# Patient Record
Sex: FEMALE | Race: WHITE | NOT HISPANIC OR LATINO
[De-identification: names, ages, dates, MRNs, and addresses within clinical notes are randomized per-mention and may not be internally consistent; named-entity substitution may affect disease eponyms.]

---

## 2017-05-03 ENCOUNTER — TRANSCRIPTION ENCOUNTER (OUTPATIENT)
Age: 72
End: 2017-05-03

## 2018-01-05 ENCOUNTER — OUTPATIENT (OUTPATIENT)
Dept: OUTPATIENT SERVICES | Facility: HOSPITAL | Age: 73
LOS: 1 days | End: 2018-01-05
Payer: MEDICARE

## 2018-01-05 ENCOUNTER — APPOINTMENT (OUTPATIENT)
Dept: MAMMOGRAPHY | Facility: HOSPITAL | Age: 73
End: 2018-01-05
Payer: MEDICARE

## 2018-01-05 DIAGNOSIS — Z00.8 ENCOUNTER FOR OTHER GENERAL EXAMINATION: ICD-10-CM

## 2018-01-05 PROCEDURE — 77063 BREAST TOMOSYNTHESIS BI: CPT | Mod: 26

## 2018-01-05 PROCEDURE — 77063 BREAST TOMOSYNTHESIS BI: CPT

## 2018-01-05 PROCEDURE — 77067 SCR MAMMO BI INCL CAD: CPT | Mod: 26

## 2018-01-05 PROCEDURE — 77067 SCR MAMMO BI INCL CAD: CPT

## 2018-01-16 ENCOUNTER — APPOINTMENT (OUTPATIENT)
Dept: MAMMOGRAPHY | Facility: HOSPITAL | Age: 73
End: 2018-01-16
Payer: MEDICARE

## 2018-01-16 ENCOUNTER — OUTPATIENT (OUTPATIENT)
Dept: OUTPATIENT SERVICES | Facility: HOSPITAL | Age: 73
LOS: 1 days | End: 2018-01-16
Payer: MEDICARE

## 2018-01-16 ENCOUNTER — APPOINTMENT (OUTPATIENT)
Dept: ULTRASOUND IMAGING | Facility: HOSPITAL | Age: 73
End: 2018-01-16

## 2018-01-16 DIAGNOSIS — Z00.8 ENCOUNTER FOR OTHER GENERAL EXAMINATION: ICD-10-CM

## 2018-01-16 PROCEDURE — 76641 ULTRASOUND BREAST COMPLETE: CPT | Mod: 26

## 2018-01-16 PROCEDURE — G0279: CPT | Mod: 26

## 2018-01-16 PROCEDURE — 77066 DX MAMMO INCL CAD BI: CPT

## 2018-01-16 PROCEDURE — 77066 DX MAMMO INCL CAD BI: CPT | Mod: 26

## 2018-01-16 PROCEDURE — 76641 ULTRASOUND BREAST COMPLETE: CPT

## 2018-01-16 PROCEDURE — G0279: CPT

## 2018-03-14 ENCOUNTER — APPOINTMENT (OUTPATIENT)
Dept: UROGYNECOLOGY | Facility: CLINIC | Age: 73
End: 2018-03-14
Payer: MEDICARE

## 2018-03-14 VITALS — BODY MASS INDEX: 27.89 KG/M2 | WEIGHT: 184 LBS | HEIGHT: 68 IN

## 2018-03-14 LAB
BILIRUB UR QL STRIP: NORMAL
CLARITY UR: NORMAL
COLLECTION METHOD: NORMAL
GLUCOSE UR-MCNC: NORMAL
HCG UR QL: 0.2 EU/DL
HGB UR QL STRIP.AUTO: NORMAL
KETONES UR-MCNC: NORMAL
LEUKOCYTE ESTERASE UR QL STRIP: NORMAL
NITRITE UR QL STRIP: POSITIVE
PH UR STRIP: 6
PROT UR STRIP-MCNC: NORMAL
SP GR UR STRIP: 1.01

## 2018-03-14 PROCEDURE — 99204 OFFICE O/P NEW MOD 45 MIN: CPT | Mod: 25

## 2018-03-14 PROCEDURE — 51701 INSERT BLADDER CATHETER: CPT

## 2018-03-15 ENCOUNTER — RECORD ABSTRACTING (OUTPATIENT)
Age: 73
End: 2018-03-15

## 2018-03-15 ENCOUNTER — RESULT REVIEW (OUTPATIENT)
Age: 73
End: 2018-03-15

## 2018-03-15 DIAGNOSIS — Z80.42 FAMILY HISTORY OF MALIGNANT NEOPLASM OF PROSTATE: ICD-10-CM

## 2018-03-15 DIAGNOSIS — Z86.39 PERSONAL HISTORY OF OTHER ENDOCRINE, NUTRITIONAL AND METABOLIC DISEASE: ICD-10-CM

## 2018-03-15 DIAGNOSIS — Z78.9 OTHER SPECIFIED HEALTH STATUS: ICD-10-CM

## 2018-03-15 DIAGNOSIS — Z80.1 FAMILY HISTORY OF MALIGNANT NEOPLASM OF TRACHEA, BRONCHUS AND LUNG: ICD-10-CM

## 2018-03-15 RX ORDER — LEVOTHYROXINE SODIUM 0.12 MG/1
125 TABLET ORAL
Refills: 0 | Status: ACTIVE | COMMUNITY

## 2018-03-16 ENCOUNTER — MESSAGE (OUTPATIENT)
Age: 73
End: 2018-03-16

## 2018-03-16 LAB
APPEARANCE: ABNORMAL
BACTERIA UR CULT: NORMAL
BACTERIA: ABNORMAL
BILIRUBIN URINE: NEGATIVE
BLOOD URINE: NEGATIVE
COLOR: YELLOW
GLUCOSE QUALITATIVE U: NEGATIVE MG/DL
HYALINE CASTS: 1 /LPF
KETONES URINE: NEGATIVE
LEUKOCYTE ESTERASE URINE: ABNORMAL
MICROSCOPIC-UA: NORMAL
NITRITE URINE: POSITIVE
PH URINE: 6
PROTEIN URINE: NEGATIVE MG/DL
RED BLOOD CELLS URINE: 1 /HPF
SPECIFIC GRAVITY URINE: 1.01
SQUAMOUS EPITHELIAL CELLS: 0 /HPF
UROBILINOGEN URINE: NEGATIVE MG/DL
WHITE BLOOD CELLS URINE: 20 /HPF

## 2018-03-21 ENCOUNTER — APPOINTMENT (OUTPATIENT)
Dept: UROGYNECOLOGY | Facility: CLINIC | Age: 73
End: 2018-03-21

## 2018-04-04 ENCOUNTER — APPOINTMENT (OUTPATIENT)
Dept: UROGYNECOLOGY | Facility: CLINIC | Age: 73
End: 2018-04-04
Payer: MEDICARE

## 2018-04-04 PROCEDURE — A4562: CPT

## 2018-04-04 PROCEDURE — 99214 OFFICE O/P EST MOD 30 MIN: CPT

## 2018-04-18 ENCOUNTER — APPOINTMENT (OUTPATIENT)
Dept: UROGYNECOLOGY | Facility: CLINIC | Age: 73
End: 2018-04-18
Payer: MEDICARE

## 2018-04-18 LAB
BILIRUB UR QL STRIP: NORMAL
CLARITY UR: NORMAL
GLUCOSE UR-MCNC: NORMAL
HCG UR QL: 0.2 EU/DL
HGB UR QL STRIP.AUTO: NORMAL
KETONES UR-MCNC: NORMAL
LEUKOCYTE ESTERASE UR QL STRIP: NORMAL
NITRITE UR QL STRIP: NORMAL
PH UR STRIP: 6
PROT UR STRIP-MCNC: NORMAL
SP GR UR STRIP: 1

## 2018-04-18 PROCEDURE — A4562: CPT

## 2018-04-18 PROCEDURE — 99214 OFFICE O/P EST MOD 30 MIN: CPT

## 2018-04-19 ENCOUNTER — RESULT REVIEW (OUTPATIENT)
Age: 73
End: 2018-04-19

## 2018-04-23 LAB
APPEARANCE: CLEAR
BACTERIA: ABNORMAL
BILIRUBIN URINE: NEGATIVE
BLOOD URINE: NEGATIVE
COLOR: YELLOW
GLUCOSE QUALITATIVE U: NEGATIVE MG/DL
HYALINE CASTS: 3 /LPF
KETONES URINE: NEGATIVE
LEUKOCYTE ESTERASE URINE: ABNORMAL
MICROSCOPIC-UA: NORMAL
NITRITE URINE: POSITIVE
PH URINE: 6
PROTEIN URINE: NEGATIVE MG/DL
RED BLOOD CELLS URINE: 1 /HPF
SPECIFIC GRAVITY URINE: 1.01
SQUAMOUS EPITHELIAL CELLS: 1 /HPF
UROBILINOGEN URINE: NEGATIVE MG/DL
WHITE BLOOD CELLS URINE: 40 /HPF

## 2018-04-25 ENCOUNTER — RESULT REVIEW (OUTPATIENT)
Age: 73
End: 2018-04-25

## 2018-04-25 LAB — BACTERIA UR CULT: ABNORMAL

## 2018-05-02 ENCOUNTER — APPOINTMENT (OUTPATIENT)
Dept: UROGYNECOLOGY | Facility: CLINIC | Age: 73
End: 2018-05-02
Payer: MEDICARE

## 2018-05-02 LAB
BILIRUB UR QL STRIP: NORMAL
CLARITY UR: NORMAL
COLLECTION METHOD: NORMAL
GLUCOSE UR-MCNC: NORMAL
HCG UR QL: 0.2 EU/DL
HGB UR QL STRIP.AUTO: NORMAL
KETONES UR-MCNC: NORMAL
LEUKOCYTE ESTERASE UR QL STRIP: NORMAL
NITRITE UR QL STRIP: NORMAL
PH UR STRIP: 5.5
PROT UR STRIP-MCNC: NORMAL
SP GR UR STRIP: 1

## 2018-05-02 PROCEDURE — 99213 OFFICE O/P EST LOW 20 MIN: CPT

## 2018-05-02 RX ORDER — NITROFURANTOIN (MONOHYDRATE/MACROCRYSTALS) 25; 75 MG/1; MG/1
100 CAPSULE ORAL
Qty: 10 | Refills: 0 | Status: DISCONTINUED | COMMUNITY
Start: 2018-04-25 | End: 2018-05-02

## 2018-05-02 RX ORDER — AMPICILLIN 500 MG/1
500 CAPSULE ORAL
Qty: 14 | Refills: 0 | Status: DISCONTINUED | COMMUNITY
Start: 2018-03-16 | End: 2018-05-02

## 2018-05-07 ENCOUNTER — RESULT REVIEW (OUTPATIENT)
Age: 73
End: 2018-05-07

## 2018-05-07 LAB — BACTERIA UR CULT: ABNORMAL

## 2018-05-11 ENCOUNTER — APPOINTMENT (OUTPATIENT)
Dept: UROGYNECOLOGY | Facility: CLINIC | Age: 73
End: 2018-05-11
Payer: MEDICARE

## 2018-05-11 PROCEDURE — 51729 CYSTOMETROGRAM W/VP&UP: CPT | Mod: 26

## 2018-05-11 PROCEDURE — 51784 ANAL/URINARY MUSCLE STUDY: CPT | Mod: 26

## 2018-05-11 PROCEDURE — 51797 INTRAABDOMINAL PRESSURE TEST: CPT | Mod: 26

## 2018-05-16 ENCOUNTER — APPOINTMENT (OUTPATIENT)
Dept: UROGYNECOLOGY | Facility: CLINIC | Age: 73
End: 2018-05-16
Payer: MEDICARE

## 2018-05-16 PROCEDURE — 51701 INSERT BLADDER CATHETER: CPT

## 2018-05-16 PROCEDURE — 99214 OFFICE O/P EST MOD 30 MIN: CPT | Mod: 25

## 2018-05-18 ENCOUNTER — APPOINTMENT (OUTPATIENT)
Dept: UROGYNECOLOGY | Facility: CLINIC | Age: 73
End: 2018-05-18
Payer: MEDICARE

## 2018-05-18 ENCOUNTER — RESULT REVIEW (OUTPATIENT)
Age: 73
End: 2018-05-18

## 2018-05-18 LAB — BACTERIA UR CULT: NORMAL

## 2018-05-18 PROCEDURE — 99214 OFFICE O/P EST MOD 30 MIN: CPT | Mod: GC

## 2018-05-24 ENCOUNTER — MESSAGE (OUTPATIENT)
Age: 73
End: 2018-05-24

## 2018-06-14 ENCOUNTER — APPOINTMENT (OUTPATIENT)
Dept: UROLOGY | Facility: CLINIC | Age: 73
End: 2018-06-14
Payer: MEDICARE

## 2018-06-14 VITALS
TEMPERATURE: 98.1 F | BODY MASS INDEX: 26.07 KG/M2 | HEART RATE: 66 BPM | RESPIRATION RATE: 16 BRPM | WEIGHT: 172 LBS | HEIGHT: 68 IN | OXYGEN SATURATION: 95 % | DIASTOLIC BLOOD PRESSURE: 72 MMHG | SYSTOLIC BLOOD PRESSURE: 110 MMHG

## 2018-06-14 DIAGNOSIS — R39.11 HESITANCY OF MICTURITION: ICD-10-CM

## 2018-06-14 DIAGNOSIS — N39.0 URINARY TRACT INFECTION, SITE NOT SPECIFIED: ICD-10-CM

## 2018-06-14 PROCEDURE — 99203 OFFICE O/P NEW LOW 30 MIN: CPT

## 2018-06-14 RX ORDER — CEPHALEXIN 500 MG/1
500 CAPSULE ORAL
Qty: 14 | Refills: 0 | Status: DISCONTINUED | COMMUNITY
Start: 2018-05-02 | End: 2018-06-14

## 2018-06-19 PROBLEM — N39.0 RECURRENT UTI: Status: ACTIVE | Noted: 2018-05-16

## 2018-06-19 PROBLEM — R39.11 URINARY HESITANCY: Status: ACTIVE | Noted: 2018-03-14

## 2018-07-24 PROBLEM — Z78.9 ALCOHOL USE: Status: ACTIVE | Noted: 2018-03-15

## 2019-03-20 ENCOUNTER — TRANSCRIPTION ENCOUNTER (OUTPATIENT)
Age: 74
End: 2019-03-20

## 2019-09-30 ENCOUNTER — MESSAGE (OUTPATIENT)
Age: 74
End: 2019-09-30

## 2019-11-11 ENCOUNTER — APPOINTMENT (OUTPATIENT)
Dept: UROGYNECOLOGY | Facility: CLINIC | Age: 74
End: 2019-11-11
Payer: MEDICARE

## 2019-11-11 PROCEDURE — 99214 OFFICE O/P EST MOD 30 MIN: CPT

## 2019-11-11 NOTE — DISCUSSION/SUMMARY
[FreeTextEntry1] : \par Batool presents with rectocele and incomplete emptying. 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 She had no improvement with flomax or with prolapse reduction (gelhorn last pessary tried). I recommend the following:\par - Cystoscopy, scheduled for 12/10/19\par - Continue CIC 4x/day\par - Patient has refill of catheters already \par - If normal cystoscopy, will recommend neurology evaluation

## 2019-11-11 NOTE — HISTORY OF PRESENT ILLNESS
[Cystocele (Obstetric)] : frequent [Uterine Prolapse] : frequent [Rectal Prolapse] : frequent [Unable To Restrain Bowel Movement] : none [Hematuria] : daily [Urinary Tract Infection] : daily [] : months ago [FreeTextEntry1] : \par 73yo with a h/o rectocele and incomplete bladder emptying. She underwent urodynamic testing and a detrusor contraction was observed however PVR was 450 cc.  Last visit was in 2018. At that time she had bothersome prolapse and was using a pessary. She had elevated PVRs, up to 1000cc, and was referred to Dr Lomeli. Patient was then referred to Dr Monae, who she did not see. \par \par She has been self-catheterizing 4-5x/day, whenever she feels like she has a full bladder. She will attempt to void ~1x/week without success. She feels the urge to urinate, but states when she tries to void only a few drops come out. Patient is not measuring catheterized volumes. She had no improvement with flomax and discontinued use. Denies any lower extremity weakness or changes in sensation. Denies infections, hematuria, incontinence. She states her prolapse is unchanged from last visit and she still needs to splint occasionally to defecate. She reports she is able to empty her bowels completely and denies constipation.

## 2019-11-11 NOTE — PHYSICAL EXAM
[No Acute Distress] : in no acute distress [Well developed] : well developed [Well Nourished] : ~L well nourished [Oriented x3] : oriented to person, place, and time [Normal Memory] : ~T memory was ~L unimpaired [Normal Mood/Affect] : mood and affect are normal [Normal Gait] : gait was normal [Warm and Dry] : was warm and dry to touch [Normal Appearance] : ~T the appearance of the neck was normal [Labia Majora] : were normal [Labia Minora] : were normal [Rectocele] : a rectocele [Atrophy] : atrophy [No Bleeding] : there was no active vaginal bleeding [Normal] : no abnormalities [Aa ____] : Aa [unfilled] [C ____] : C [unfilled] [Ba ____] : Ba [unfilled] [Ap ____] : Ap [unfilled] [Bp ____] : Bp [unfilled]

## 2019-12-10 ENCOUNTER — OUTPATIENT (OUTPATIENT)
Dept: OUTPATIENT SERVICES | Facility: HOSPITAL | Age: 74
LOS: 1 days | End: 2019-12-10
Payer: MEDICARE

## 2019-12-10 ENCOUNTER — APPOINTMENT (OUTPATIENT)
Dept: UROGYNECOLOGY | Facility: CLINIC | Age: 74
End: 2019-12-10
Payer: MEDICARE

## 2019-12-10 DIAGNOSIS — Z01.818 ENCOUNTER FOR OTHER PREPROCEDURAL EXAMINATION: ICD-10-CM

## 2019-12-10 PROCEDURE — 52000 CYSTOURETHROSCOPY: CPT

## 2020-03-10 ENCOUNTER — APPOINTMENT (OUTPATIENT)
Dept: UROGYNECOLOGY | Facility: CLINIC | Age: 75
End: 2020-03-10

## 2021-12-10 ENCOUNTER — NON-APPOINTMENT (OUTPATIENT)
Age: 76
End: 2021-12-10

## 2021-12-13 ENCOUNTER — APPOINTMENT (OUTPATIENT)
Dept: UROGYNECOLOGY | Facility: CLINIC | Age: 76
End: 2021-12-13
Payer: MEDICARE

## 2021-12-13 PROCEDURE — 99214 OFFICE O/P EST MOD 30 MIN: CPT

## 2021-12-13 NOTE — PHYSICAL EXAM
[No Acute Distress] : in no acute distress [Well developed] : well developed [Well Nourished] : ~L well nourished [Oriented x3] : oriented to person, place, and time [Normal Memory] : ~T memory was ~L unimpaired [Normal Mood/Affect] : mood and affect are normal [Normal Appearance] : ~T the appearance of the neck was normal [Warm and Dry] : was warm and dry to touch [Normal Gait] : gait was normal [Labia Majora] : were normal [Labia Minora] : were normal [Atrophy] : atrophy [Rectocele] : a rectocele [No Bleeding] : there was no active vaginal bleeding [Aa ____] : Aa [unfilled] [Ba ____] : Ba [unfilled] [C ____] : C [unfilled] [Normal] : no abnormalities [Ap ____] : Ap [unfilled] [Bp ____] : Bp [unfilled]

## 2021-12-13 NOTE — DISCUSSION/SUMMARY
[FreeTextEntry1] : \par Batool presents with rectocele and incomplete emptying. 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 She had no improvement with flomax or with prolapse reduction (gelhorn last pessary tried). I recommend the following:\par - Continue CIC 4x/day, catheters provided\par - Rx provided for catheters\par -  recommend neurology evaluation, another copy of referral note provided\par -obtain renal sonogram, Rx provided

## 2021-12-13 NOTE — HISTORY OF PRESENT ILLNESS
[FreeTextEntry1] : \nba Renae presents for follow up with a h/o rectocele and incomplete bladder emptying.\par \par  She underwent urodynamic testing in 2018 and a detrusor contraction was observed however PVR was 450 cc.  She underwent cystoscopy 12/19. Last visit was in  December 2019. For her prolapse she previously used pessary however there was no change in her voiding with prolapse reduction. She no longer uses pessary and denies significant bother from prolapse.\par \par She has been self-catheterizing 4-5x/day, whenever she feels like she has a full bladder.  She feels the urge to urinate, but states when she tries to void only a few drops come out. Patient is not measuring catheterized volumes. She had no improvement with flomax in past and discontinued use. She denies any lower extremity weakness or changes in sensation. Denies infections, hematuria, incontinence. She states her prolapse is unchanged from last visit and she still needs to splint ~50% of time to completely defecate. She reports she is able to empty her bowels completely with splinting and denies constipation. \par \par At her last visit I recommended she see neurology however she was unable to due to pandemic. She agrees to go now. She also did not obtain renal/bladder sono.

## 2021-12-13 NOTE — PROCEDURE
[Straight Catheterization] : insertion of a straight catheter [Urinary Retention] : urinary retention [Clear] : clear [No Complications] : no complications [Tolerated Well] : the patient tolerated the procedure well [Post procedure instructions and information given] : Post procedure instructions and information were given and reviewed with patient. [0] : 0 [FreeTextEntry1] : 100 cc urine collected, paitent had catheterized 1 hour earlier

## 2021-12-21 ENCOUNTER — NON-APPOINTMENT (OUTPATIENT)
Age: 76
End: 2021-12-21

## 2021-12-27 ENCOUNTER — NON-APPOINTMENT (OUTPATIENT)
Age: 76
End: 2021-12-27

## 2021-12-28 ENCOUNTER — NON-APPOINTMENT (OUTPATIENT)
Age: 76
End: 2021-12-28

## 2022-02-12 ENCOUNTER — EMERGENCY (EMERGENCY)
Facility: HOSPITAL | Age: 77
LOS: 1 days | Discharge: HOME | End: 2022-02-12
Attending: EMERGENCY MEDICINE | Admitting: EMERGENCY MEDICINE
Payer: MEDICARE

## 2022-02-12 VITALS
HEART RATE: 75 BPM | OXYGEN SATURATION: 97 % | RESPIRATION RATE: 18 BRPM | DIASTOLIC BLOOD PRESSURE: 93 MMHG | TEMPERATURE: 98 F | WEIGHT: 198.64 LBS | SYSTOLIC BLOOD PRESSURE: 187 MMHG

## 2022-02-12 VITALS
HEART RATE: 71 BPM | DIASTOLIC BLOOD PRESSURE: 71 MMHG | TEMPERATURE: 98 F | SYSTOLIC BLOOD PRESSURE: 129 MMHG | OXYGEN SATURATION: 98 % | RESPIRATION RATE: 17 BRPM

## 2022-02-12 DIAGNOSIS — Z90.49 ACQUIRED ABSENCE OF OTHER SPECIFIED PARTS OF DIGESTIVE TRACT: Chronic | ICD-10-CM

## 2022-02-12 DIAGNOSIS — Z96.651 PRESENCE OF RIGHT ARTIFICIAL KNEE JOINT: Chronic | ICD-10-CM

## 2022-02-12 LAB
ALBUMIN SERPL ELPH-MCNC: 4.3 G/DL — SIGNIFICANT CHANGE UP (ref 3.3–5)
ALP SERPL-CCNC: 63 U/L — SIGNIFICANT CHANGE UP (ref 40–120)
ALT FLD-CCNC: 17 U/L — SIGNIFICANT CHANGE UP (ref 10–45)
ANION GAP SERPL CALC-SCNC: 10 MMOL/L — SIGNIFICANT CHANGE UP (ref 5–17)
APTT BLD: 29.2 SEC — SIGNIFICANT CHANGE UP (ref 27.5–35.5)
AST SERPL-CCNC: 14 U/L — SIGNIFICANT CHANGE UP (ref 10–40)
BASOPHILS # BLD AUTO: 0.06 K/UL — SIGNIFICANT CHANGE UP (ref 0–0.2)
BASOPHILS NFR BLD AUTO: 1 % — SIGNIFICANT CHANGE UP (ref 0–2)
BILIRUB SERPL-MCNC: 0.3 MG/DL — SIGNIFICANT CHANGE UP (ref 0.2–1.2)
BUN SERPL-MCNC: 17 MG/DL — SIGNIFICANT CHANGE UP (ref 7–23)
CALCIUM SERPL-MCNC: 9.8 MG/DL — SIGNIFICANT CHANGE UP (ref 8.4–10.5)
CHLORIDE SERPL-SCNC: 100 MMOL/L — SIGNIFICANT CHANGE UP (ref 96–108)
CO2 SERPL-SCNC: 27 MMOL/L — SIGNIFICANT CHANGE UP (ref 22–31)
CREAT SERPL-MCNC: 0.81 MG/DL — SIGNIFICANT CHANGE UP (ref 0.5–1.3)
EOSINOPHIL # BLD AUTO: 0.12 K/UL — SIGNIFICANT CHANGE UP (ref 0–0.5)
EOSINOPHIL NFR BLD AUTO: 2 % — SIGNIFICANT CHANGE UP (ref 0–6)
GLUCOSE SERPL-MCNC: 110 MG/DL — HIGH (ref 70–99)
HCT VFR BLD CALC: 39.1 % — SIGNIFICANT CHANGE UP (ref 34.5–45)
HGB BLD-MCNC: 12.8 G/DL — SIGNIFICANT CHANGE UP (ref 11.5–15.5)
IMM GRANULOCYTES NFR BLD AUTO: 0.3 % — SIGNIFICANT CHANGE UP (ref 0–1.5)
INR BLD: 0.99 RATIO — SIGNIFICANT CHANGE UP (ref 0.88–1.16)
LYMPHOCYTES # BLD AUTO: 1.26 K/UL — SIGNIFICANT CHANGE UP (ref 1–3.3)
LYMPHOCYTES # BLD AUTO: 21.2 % — SIGNIFICANT CHANGE UP (ref 13–44)
MCHC RBC-ENTMCNC: 30.2 PG — SIGNIFICANT CHANGE UP (ref 27–34)
MCHC RBC-ENTMCNC: 32.7 GM/DL — SIGNIFICANT CHANGE UP (ref 32–36)
MCV RBC AUTO: 92.2 FL — SIGNIFICANT CHANGE UP (ref 80–100)
MONOCYTES # BLD AUTO: 0.45 K/UL — SIGNIFICANT CHANGE UP (ref 0–0.9)
MONOCYTES NFR BLD AUTO: 7.6 % — SIGNIFICANT CHANGE UP (ref 2–14)
NEUTROPHILS # BLD AUTO: 4.03 K/UL — SIGNIFICANT CHANGE UP (ref 1.8–7.4)
NEUTROPHILS NFR BLD AUTO: 67.9 % — SIGNIFICANT CHANGE UP (ref 43–77)
NRBC # BLD: 0 /100 WBCS — SIGNIFICANT CHANGE UP (ref 0–0)
PLATELET # BLD AUTO: 228 K/UL — SIGNIFICANT CHANGE UP (ref 150–400)
POTASSIUM SERPL-MCNC: 4.5 MMOL/L — SIGNIFICANT CHANGE UP (ref 3.5–5.3)
POTASSIUM SERPL-SCNC: 4.5 MMOL/L — SIGNIFICANT CHANGE UP (ref 3.5–5.3)
PROT SERPL-MCNC: 8.2 G/DL — SIGNIFICANT CHANGE UP (ref 6–8.3)
PROTHROM AB SERPL-ACNC: 12 SEC — SIGNIFICANT CHANGE UP (ref 10.6–13.6)
RBC # BLD: 4.24 M/UL — SIGNIFICANT CHANGE UP (ref 3.8–5.2)
RBC # FLD: 12.4 % — SIGNIFICANT CHANGE UP (ref 10.3–14.5)
SARS-COV-2 RNA SPEC QL NAA+PROBE: SIGNIFICANT CHANGE UP
SODIUM SERPL-SCNC: 137 MMOL/L — SIGNIFICANT CHANGE UP (ref 135–145)
TROPONIN I, HIGH SENSITIVITY RESULT: 4.7 NG/L — SIGNIFICANT CHANGE UP
WBC # BLD: 5.94 K/UL — SIGNIFICANT CHANGE UP (ref 3.8–10.5)
WBC # FLD AUTO: 5.94 K/UL — SIGNIFICANT CHANGE UP (ref 3.8–10.5)

## 2022-02-12 PROCEDURE — 93010 ELECTROCARDIOGRAM REPORT: CPT

## 2022-02-12 PROCEDURE — 85025 COMPLETE CBC W/AUTO DIFF WBC: CPT

## 2022-02-12 PROCEDURE — 70450 CT HEAD/BRAIN W/O DYE: CPT | Mod: XU,MA

## 2022-02-12 PROCEDURE — 70496 CT ANGIOGRAPHY HEAD: CPT | Mod: 26,MA

## 2022-02-12 PROCEDURE — 99285 EMERGENCY DEPT VISIT HI MDM: CPT

## 2022-02-12 PROCEDURE — 70496 CT ANGIOGRAPHY HEAD: CPT | Mod: MA

## 2022-02-12 PROCEDURE — 36415 COLL VENOUS BLD VENIPUNCTURE: CPT

## 2022-02-12 PROCEDURE — 70498 CT ANGIOGRAPHY NECK: CPT | Mod: MA

## 2022-02-12 PROCEDURE — 84484 ASSAY OF TROPONIN QUANT: CPT

## 2022-02-12 PROCEDURE — 93005 ELECTROCARDIOGRAM TRACING: CPT

## 2022-02-12 PROCEDURE — 70498 CT ANGIOGRAPHY NECK: CPT | Mod: 26,MA

## 2022-02-12 PROCEDURE — 71045 X-RAY EXAM CHEST 1 VIEW: CPT

## 2022-02-12 PROCEDURE — 80053 COMPREHEN METABOLIC PANEL: CPT

## 2022-02-12 PROCEDURE — 85730 THROMBOPLASTIN TIME PARTIAL: CPT

## 2022-02-12 PROCEDURE — 82962 GLUCOSE BLOOD TEST: CPT

## 2022-02-12 PROCEDURE — 85610 PROTHROMBIN TIME: CPT

## 2022-02-12 PROCEDURE — 71045 X-RAY EXAM CHEST 1 VIEW: CPT | Mod: 26

## 2022-02-12 PROCEDURE — 0042T: CPT

## 2022-02-12 PROCEDURE — 87635 SARS-COV-2 COVID-19 AMP PRB: CPT

## 2022-02-12 PROCEDURE — 99285 EMERGENCY DEPT VISIT HI MDM: CPT | Mod: 25

## 2022-02-12 RX ORDER — ASPIRIN/CALCIUM CARB/MAGNESIUM 324 MG
324 TABLET ORAL ONCE
Refills: 0 | Status: COMPLETED | OUTPATIENT
Start: 2022-02-12 | End: 2022-02-12

## 2022-02-12 RX ADMIN — Medication 324 MILLIGRAM(S): at 22:22

## 2022-02-12 NOTE — ED PROVIDER NOTE - OBJECTIVE STATEMENT
77 y/o F with PMH of HTN, HLD presents to the ED with c/o b/l visual change (felt like there was a square box around her eyes), had word finding difficulty and was confused at 430pm this afternoon while driving home on the same route she always uses. Sympts last few hours and is now resolved. Also report mild HA at the time which is now resolved. She denies CP, SOB, palpitation, n/v, dizziness, abd pain, extremity weakness, paresthesias or all other complaints 75 y/o F with PMH of thyroid dz presents to the ED with c/o b/l visual change (pt was seeing objects in pieces), had word finding difficulty and was confused at 430pm this afternoon while driving home on the same route she always uses. Sympts last few hours and is now resolved. Also report mild HA at the time which is now resolved. She denies CP, SOB, palpitation, n/v, f/c, URI sympts, dizziness, abd pain, extremity weakness, paresthesias or all other complaints

## 2022-02-12 NOTE — ED ADULT NURSE NOTE - CHIEF COMPLAINT QUOTE
Patient presents to ED from home complaining of blurred vision & trouble finding words from 4:30pm-7:30pm. Patient is hypertensive with mild headache in triage but other symptoms have subsided. PA called to triage and neurological exam completed. Patient taken immediately to CT scan, fingerstick 96. ISAR negative.

## 2022-02-12 NOTE — ED PROVIDER NOTE - ATTENDING CONTRIBUTION TO CARE
I have personally seen and examined this patient. I have fully participated in the care of this patient. I have reviewed all pertinent clinical information, including history physical exam, plan and the PA's note and agree except as noted  77 y/o F with PMH of thyroid dz presents to the ED with c/o b/l visual change (pt was seeing objects in pieces), had word finding difficulty and was confused at 430pm this afternoon while driving home on the same route she always uses. Sympts last few hours and is now resolved. Also report mild HA at the time which is now resolved. She denies CP, SOB, palpitation, n/v, f/c, URI sympts, dizziness, abd pain, extremity weakness, paresthesias or all other complaints

## 2022-02-12 NOTE — ED PROVIDER NOTE - MUSCULOSKELETAL, MLM
Spine appears normal, range of motion is not limited, no muscle or joint tenderness. pt ambulating with normal gait in the ED

## 2022-02-12 NOTE — ED ADULT TRIAGE NOTE - CHIEF COMPLAINT QUOTE
Patient presents to ED from home complaining of blurred vision & trouble finding words from 4:30pm-7:30pm. Patient is hypertensive with mild headache in triage but other symptoms have subsided. PA called to triage and neurological exam completed. Patient taken immediately to CT scan, fingerstick 96. Patient presents to ED from home complaining of blurred vision & trouble finding words from 4:30pm-7:30pm. Patient is hypertensive with mild headache in triage but other symptoms have subsided. PA called to triage and neurological exam completed. Patient taken immediately to CT scan, fingerstick 96. ISAR negative.

## 2022-02-12 NOTE — ED PROVIDER NOTE - CLINICAL SUMMARY MEDICAL DECISION MAKING FREE TEXT BOX
77 y/o F with PMH of thyroid dz presents to the ED with c/o b/l visual change (pt was seeing objects in pieces), had word finding difficulty and was confused at 430pm this afternoon while driving home on the same route she always uses. Sympts last few hours and is now resolved. Also report mild HA at the time which is now resolved. She denies CP, SOB, palpitation, n/v, f/c, URI sympts, dizziness, abd pain, extremity weakness, paresthesias or all other complaints. PE as noted above. NIHSS is 0.  labs/imaging pending 75 y/o F with PMH of thyroid dz presents to the ED with c/o b/l visual change (pt was seeing objects in pieces), had word finding difficulty and was confused at 430pm this afternoon while driving home on the same route she always uses. Sympts last few hours and is now resolved. Also report mild HA at the time which is now resolved. She denies CP, SOB, palpitation, n/v, f/c, URI sympts, dizziness, abd pain, extremity weakness, paresthesias or all other complaints. PE as noted above. NIHSS is 0.  labs/imaging pending    10pm: labs reviewed, CT results reviewed with patient and her son at bedside. shared decision making with patient, she prefers to follow up outpatient with neurology. ASA given in the ED. will dc with instructions to take ASA 81mg daily until she sees neuro

## 2022-02-12 NOTE — ED PROVIDER NOTE - EYES, MLM
Clear bilaterally, pupils equal, round and reactive to light. Clear bilaterally, pupils equal, round and reactive to light. EOMI. Visual fields intact

## 2022-02-12 NOTE — ED ADULT NURSE NOTE - OBJECTIVE STATEMENT
Pt is alert, brought to the ER by son due to headache and difficulty remembering things like the name of the drink that she wanted to order at the restaurant around 5 PM/ Denies slurred speech, chest pain or SOB. Headache started around 430 pm with slight nausea. Pt took Tylenol for the headache PTA. Pt also reported changes in vision that lasted from 5 to 7 PM. Images appear distorted . History of Hypothyroidism. Unvaccinated for Covid.

## 2022-02-12 NOTE — ED PROVIDER NOTE - NSFOLLOWUPINSTRUCTIONS_ED_ALL_ED_FT
Follow up with your primary care physician and neurology within 1 week, call the number provided for an appointment. Take the copy of your test results you were given in the emergency room for your doctor to review.     Take over the counter Aspirin 81mg daily until you see the neurologist     Stay hydrated    Return to the ER if your symptoms worsen or for any other medical emergencies  *************    Transient Ischemic Attack    WHAT YOU NEED TO KNOW:    A transient ischemic attack (TIA), or mini-stroke, happens when blood cannot flow to part of the brain. A TIA only lasts minutes to hours and does not cause lasting damage. It is still important to get immediate medical care. A TIA may be a warning that you are about to have an ischemic stroke. An ischemic stroke happens when blood flow to the brain is suddenly blocked, usually by a blood clot.    Ischemic Stroke         DISCHARGE INSTRUCTIONS:    Call your local emergency number (911 in the [place]) or have someone else call if:   •You have any of the following signs of a stroke: ?Numbness or drooping on one side of your face       ?Weakness in an arm or leg      ?Confusion or difficulty speaking      ?Dizziness, a severe headache, or vision loss    BE FAST SIGNS OF A STROKE         •You have a seizure.      •You have chest pain or shortness of breath.      •You cough up blood.      Return to the emergency department if:   •Your arm or leg feels warm, tender, and painful. It may look swollen and red.      •You have unusual or heavy bleeding.      •You have a severe headache or feel dizzy.      Call your doctor or neurologist if:   •Your blood pressure or blood sugar level is higher or lower than you were told it should be.      •You have questions or concerns about your condition or care.      Warning signs of a stroke: The words BE FAST can help you remember and recognize warning signs of a stroke:  •B = Balance: Sudden loss of balance      •E = Eyes: Loss of vision in one or both eyes      •F = Face: Face droops on one side      •A = Arms: Arm drops when both arms are raised      •S = Speech: Speech is slurred or sounds different      •T = Time: Time to get help immediately    BE FAST SIGNS OF A STROKE         Medicines: You may need any of the following:   •Antiplatelets, such as aspirin, help prevent blood clots. Take your antiplatelet medicine exactly as directed. These medicines make it more likely for you to bleed or bruise. If you are told to take aspirin, do not take acetaminophen or ibuprofen instead.       •Blood thinners help prevent blood clots. Clots can cause strokes, heart attacks, and death. The following are general safety guidelines to follow while you are taking a blood thinner:?Watch for bleeding and bruising while you take blood thinners. Watch for bleeding from your gums or nose. Watch for blood in your urine and bowel movements. Use a soft washcloth on your skin, and a soft toothbrush to brush your teeth. This can keep your skin and gums from bleeding. If you shave, use an electric shaver. Do not play contact sports.       ?Tell your dentist and other healthcare providers that you take a blood thinner. Wear a bracelet or necklace that says you take this medicine.       ?Do not start or stop any other medicines unless your healthcare provider tells you to. Many medicines cannot be used with blood thinners.      ?Take your blood thinner exactly as prescribed by your healthcare provider. Do not skip does or take less than prescribed. Tell your provider right away if you forget to take your blood thinner, or if you take too much.      ?Warfarin is a blood thinner that you may need to take. The following are things you should be aware of if you take warfarin: ?Foods and medicines can affect the amount of warfarin in your blood. Do not make major changes to your diet while you take warfarin. Warfarin works best when you eat about the same amount of vitamin K every day. Vitamin K is found in green leafy vegetables and certain other foods. Ask for more information about what to eat when you are taking warfarin.      ?You will need to see your healthcare provider for follow-up visits when you are on warfarin. You will need regular blood tests. These tests are used to decide how much medicine you need.         •Other medicines may be needed to treat diabetes, depression, high cholesterol, or blood pressure problems. You may also need medicine to decrease the pressure in your brain, reduce pain, or prevent seizures.      •Take your medicine as directed. Contact your healthcare provider if you think your medicine is not helping or if you have side effects. Tell him of her if you are allergic to any medicine. Keep a list of the medicines, vitamins, and herbs you take. Include the amounts, and when and why you take them. Bring the list or the pill bottles to follow-up visits. Carry your medicine list with you in case of an emergency.      What you can do to prevent another TIA or a stroke:   •Manage health conditions. A condition such as diabetes can increase your risk for a stroke. Control your blood sugar level if you have hyperglycemia or diabetes. Take your prescribed medicines and check your blood sugar level as directed.  How to check your blood sugar           •Check your blood pressure as directed. High blood pressure can increase your risk for a stroke. If you have high blood pressure, follow your healthcare provider’s directions for controlling your blood pressure.   How to take a Blood Pressure           •Do not use nicotine products or illegal drugs. Nicotine and other chemicals in cigarettes and cigars can cause blood vessel damage. Nicotine and illegal drugs both increase your risk for a stroke. Ask your healthcare provider for information if you currently smoke or use drugs and need help to quit. E- cigarettes or smokeless tobacco still contain nicotine. Talk to your healthcare provider before you use these products.      •Talk to your healthcare provider about alcohol. Alcohol can raise your blood pressure. The recommended limit is 2 drinks in a day for men and 1 drink in a day for women. Do not binge drink or save a week's worth of alcohol to drink in 1 or 2 days. Limit weekly amounts as directed by your provider.      •Eat a variety of healthy foods. Healthy foods include whole-grain breads, low-fat dairy products, beans, lean meats, and fish. Eat at least 5 servings of fruits and vegetables each day. Choose foods that are low in fat, cholesterol, salt, and sugar. Eat foods that are high in potassium, such as potatoes and bananas. A dietitian can help you create healthy meal plans.   Healthy Foods           •Maintain a healthy weight. Ask your healthcare provider how much you should weigh. Ask him or her to help you create a weight loss plan if you are overweight. He or she can help you create small goals if you have a lot of weight to lose.      •Exercise as directed. Exercise can lower your blood pressure, cholesterol, weight, and blood sugar levels. Healthcare providers will help you create exercise goals. They can also help you make a plan to reach your goals. For example, you can break exercise into 10 minute periods, 3 times in the day. Find an exercise that you enjoy. This will make it easier for you to reach your exercise goals.  Walking for Exercise           •Manage stress. Stress can raise your blood pressure. Find ways to relax, such as deep breathing or listening to music.      Follow up with your doctor or neurologist in 1 to 2 days: Write down your questions so you remember to ask them during your visits.

## 2022-02-12 NOTE — ED ADULT TRIAGE NOTE - LOCATION:
[FreeTextEntry1] : EKG today was within normal limits\par PFT from today revealed normal spirometry\par  Left arm;

## 2022-02-12 NOTE — ED PROVIDER NOTE - CARE PROVIDER_API CALL
Joselyn Zheng  NEUROLOGY  [address] [place] [address]  Phone: [phone]  Fax: [phone]  Follow Up Time:     Joni Wright (MD)  Neurology  [address] [facility], [address]  [place], [place] [id number]  Phone: [phone]  Fax: [phone]  Follow Up Time:

## 2022-02-12 NOTE — ED ADULT NURSE NOTE - NSIMPLEMENTINTERV_GEN_ALL_ED
Implemented All Universal Safety Interventions:  [place] to call system. Call bell, personal items and telephone within reach. Instruct patient to call for assistance. Room bathroom lighting operational. Non-slip footwear when patient is off stretcher. Physically safe environment: no spills, clutter or unnecessary equipment. Stretcher in lowest position, wheels locked, appropriate side rails in place.

## 2022-02-14 PROBLEM — E03.9 HYPOTHYROIDISM, UNSPECIFIED: Chronic | Status: ACTIVE | Noted: 2022-02-12

## 2022-02-17 NOTE — CHART NOTE - NSCHARTNOTEFT_GEN_A_CORE
SW called Pt at home to discuss and assist with followup care. Pt presenting to  ED on 2/12 and diagnosed with TIA. SW left a message encouraging Pt to return SW call should Pt have further needs and questions. Per Malena BARRAZA, Pt has a follow-up neurology appointment scheduled for 3/17.

## 2022-02-28 ENCOUNTER — APPOINTMENT (OUTPATIENT)
Dept: CT IMAGING | Facility: HOSPITAL | Age: 77
End: 2022-02-28
Payer: MEDICARE

## 2022-02-28 ENCOUNTER — OUTPATIENT (OUTPATIENT)
Dept: OUTPATIENT SERVICES | Facility: HOSPITAL | Age: 77
LOS: 1 days | End: 2022-02-28
Payer: MEDICARE

## 2022-02-28 DIAGNOSIS — Z90.49 ACQUIRED ABSENCE OF OTHER SPECIFIED PARTS OF DIGESTIVE TRACT: Chronic | ICD-10-CM

## 2022-02-28 DIAGNOSIS — Z96.651 PRESENCE OF RIGHT ARTIFICIAL KNEE JOINT: Chronic | ICD-10-CM

## 2022-02-28 DIAGNOSIS — Z00.8 ENCOUNTER FOR OTHER GENERAL EXAMINATION: ICD-10-CM

## 2022-02-28 PROCEDURE — 74176 CT ABD & PELVIS W/O CONTRAST: CPT

## 2022-02-28 PROCEDURE — 74176 CT ABD & PELVIS W/O CONTRAST: CPT | Mod: 26

## 2022-03-17 ENCOUNTER — APPOINTMENT (OUTPATIENT)
Dept: NEUROLOGY | Facility: CLINIC | Age: 77
End: 2022-03-17
Payer: MEDICARE

## 2022-03-17 VITALS
SYSTOLIC BLOOD PRESSURE: 118 MMHG | BODY MASS INDEX: 29.55 KG/M2 | HEIGHT: 68 IN | WEIGHT: 195 LBS | HEART RATE: 70 BPM | DIASTOLIC BLOOD PRESSURE: 76 MMHG

## 2022-03-17 DIAGNOSIS — Z78.9 OTHER SPECIFIED HEALTH STATUS: ICD-10-CM

## 2022-03-17 DIAGNOSIS — H53.30 UNSPECIFIED DISORDER OF BINOCULAR VISION: ICD-10-CM

## 2022-03-17 DIAGNOSIS — Z82.49 FAMILY HISTORY OF ISCHEMIC HEART DISEASE AND OTHER DISEASES OF THE CIRCULATORY SYSTEM: ICD-10-CM

## 2022-03-17 DIAGNOSIS — Z86.73 PERSONAL HISTORY OF TRANSIENT ISCHEMIC ATTACK (TIA), AND CEREBRAL INFARCTION W/OUT RESIDUAL DEFICITS: ICD-10-CM

## 2022-03-17 DIAGNOSIS — G43.109 MIGRAINE WITH AURA, NOT INTRACTABLE, W/OUT STATUS MIGRAINOSUS: ICD-10-CM

## 2022-03-17 DIAGNOSIS — Z82.0 FAMILY HISTORY OF EPILEPSY AND OTHER DISEASES OF THE NERVOUS SYSTEM: ICD-10-CM

## 2022-03-17 PROCEDURE — 99205 OFFICE O/P NEW HI 60 MIN: CPT

## 2022-03-17 NOTE — CONSULT LETTER
[Dear  ___] : Dear  [unfilled], [Consult Letter:] : I had the pleasure of evaluating your patient, [unfilled]. [Please see my note below.] : Please see my note below. [Consult Closing:] : Thank you very much for allowing me to participate in the care of this patient.  If you have any questions, please do not hesitate to contact me. [Sincerely,] : Sincerely, [FreeTextEntry3] : Joni Wright MD\par

## 2022-03-17 NOTE — HISTORY OF PRESENT ILLNESS
[FreeTextEntry1] : Araceli Escudero is seen for an office consultation.  She is a 76-year-old right-handed woman who states that on 2/12/2022 while at the pottery studio she describes binocular vision in pieces or fractured vision.  The episode lasted for approximately 15 minutes.  She subsequently was with friends and noted word finding and symptoms to suggest aphasia.  There was a mild frontal headache.  She was seen in the [facility] emergency room on 2/12/2022 and CT angiogram of the head neck and CT of the brain were all negative.  Blood tests were unremarkable.  She states that during the last 3 years she has had approximately 3 episodes of binocular visual disturbance but these were mild and lasted only 5 to 10 minutes and then 1 were no definite problems with language during those episodes though she did not attempt to verbalize.  Her blood pressure was elevated in the emergency room.  She was told to take aspirin 81 mg daily.\par \par Past history significant for hypothyroidism.  She reports borderline elevation of cholesterol.  She denies any prior history of hypertension.\par \par Family history positive for cerebral aneurysm and migraine and benign brain tumor.

## 2022-03-17 NOTE — ASSESSMENT
[FreeTextEntry1] : Impression: This 76-year-old female right-handed patient presented on 2/12/2022 with acute episode of binocular visual disturbance described as in pieces and fractured associated with aphasia and mild frontal headache.  During the last 3 years she has had 3 prior episodes of a similar visual disturbance.  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  Rule out TIA .  The CT of the brain and CT angiograms of head and neck performed in the emergency room were negative.  Today's neurological exam is unremarkable in this regard.\par \par Recommendations: MRI of the brain and MRA studies brain and neck.  Aspirin 81 mg daily for least the next 6 months and reevaluate.  If serum cholesterol is elevated would treat with statin medication.  Cardiology consult Dr. Jones's group to rule out emboli.  Office follow-up.\par

## 2022-03-17 NOTE — PHYSICAL EXAM
[FreeTextEntry1] : Head:  Normocephalic Neck: Supple nontender no carotid bruits.\par \par Mental Status:  Alert Oriented X3 Speech normal and no aphasia or dysarthria.\par \par Cranial Nerves:  PERRL, Visual Fields full  EOMI no diplopia no ptosis no nystagmus, V through XII intact.\par \par Motor:  No drift, normal strength tone and coordination and no focal atrophy. No abnormal movements. No dysmetria.  Normal rapid alternating movements. \par \par DTRs: Symmetric 1-2+ except for bilaterally absent ankle reflexes.  Plantars flexor.  No Clonus.\par \par Sensory:  Normal testing with pin light touch and vibration and  Joint position sense.  Normal DSS to touch.\par \par Gait:  Normal including tandem walking heel toe walking and Rhomberg.\par

## 2022-03-17 NOTE — REASON FOR VISIT
[Initial Evaluation] : an initial evaluation [FreeTextEntry1] : Episodes of binocular visual disturbance and aphasia

## 2022-04-04 ENCOUNTER — OUTPATIENT (OUTPATIENT)
Dept: OUTPATIENT SERVICES | Facility: HOSPITAL | Age: 77
LOS: 1 days | End: 2022-04-04
Payer: MEDICARE

## 2022-04-04 ENCOUNTER — APPOINTMENT (OUTPATIENT)
Dept: MRI IMAGING | Facility: HOSPITAL | Age: 77
End: 2022-04-04
Payer: MEDICARE

## 2022-04-04 DIAGNOSIS — G43.109 MIGRAINE WITH AURA, NOT INTRACTABLE, WITHOUT STATUS MIGRAINOSUS: ICD-10-CM

## 2022-04-04 DIAGNOSIS — Z86.73 PERSONAL HISTORY OF TRANSIENT ISCHEMIC ATTACK (TIA), AND CEREBRAL INFARCTION WITHOUT RESIDUAL DEFICITS: ICD-10-CM

## 2022-04-04 DIAGNOSIS — Z96.651 PRESENCE OF RIGHT ARTIFICIAL KNEE JOINT: Chronic | ICD-10-CM

## 2022-04-04 DIAGNOSIS — Z90.49 ACQUIRED ABSENCE OF OTHER SPECIFIED PARTS OF DIGESTIVE TRACT: Chronic | ICD-10-CM

## 2022-04-04 PROCEDURE — 70544 MR ANGIOGRAPHY HEAD W/O DYE: CPT | Mod: 26,59

## 2022-04-04 PROCEDURE — 70551 MRI BRAIN STEM W/O DYE: CPT

## 2022-04-04 PROCEDURE — 70547 MR ANGIOGRAPHY NECK W/O DYE: CPT

## 2022-04-04 PROCEDURE — 70551 MRI BRAIN STEM W/O DYE: CPT | Mod: 26

## 2022-04-04 PROCEDURE — 70544 MR ANGIOGRAPHY HEAD W/O DYE: CPT

## 2022-04-04 PROCEDURE — 70547 MR ANGIOGRAPHY NECK W/O DYE: CPT | Mod: 26

## 2022-04-05 ENCOUNTER — NON-APPOINTMENT (OUTPATIENT)
Age: 77
End: 2022-04-05

## 2022-04-07 ENCOUNTER — NON-APPOINTMENT (OUTPATIENT)
Age: 77
End: 2022-04-07

## 2022-04-07 ENCOUNTER — APPOINTMENT (OUTPATIENT)
Dept: CARDIOLOGY | Facility: CLINIC | Age: 77
End: 2022-04-07
Payer: MEDICARE

## 2022-04-07 VITALS
BODY MASS INDEX: 31.08 KG/M2 | RESPIRATION RATE: 16 BRPM | HEIGHT: 67 IN | HEART RATE: 69 BPM | WEIGHT: 198 LBS | TEMPERATURE: 97.8 F | SYSTOLIC BLOOD PRESSURE: 120 MMHG | OXYGEN SATURATION: 98 % | DIASTOLIC BLOOD PRESSURE: 83 MMHG

## 2022-04-07 PROCEDURE — 93000 ELECTROCARDIOGRAM COMPLETE: CPT | Mod: 59

## 2022-04-07 PROCEDURE — 99203 OFFICE O/P NEW LOW 30 MIN: CPT

## 2022-04-07 PROCEDURE — 93246 EXT ECG>7D<15D RECORDING: CPT

## 2022-04-14 NOTE — CARDIOLOGY SUMMARY
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 No associated mass effect, bleeding, or shift. Patent central intracranial circulation. No hemodynamically significant stenosis or occlusion. Patent cervical vasculature. No hemodynamically significant stenosis or occlusion.\par

## 2022-04-14 NOTE — HISTORY OF PRESENT ILLNESS
[FreeTextEntry1] : Batool Escudero is a 76 year old woman, former remote cigarette smoker with past medical history of Hypothyroidism with recent ER visit for visual disturbances and word finding difficulties in February presents for cardiac consultation regarding possible cardioembolic cause of lacunar CVA noted on recent MRI brain.\par \par The patient reports that back in February she recalls having dinner with friends and started to have word finding difficulties for about 40 minutes. She also had visual disturbances which lasted about 1 hour. She presented to [facility] for further evaluation. She does recall having previous episodes of blurred/"fractured" vision 2-3 years ago, she recalls being alone at that time, so is not sure if she had speech difficulties. She is active, walks around, has not experienced exertional chest pain, can notice some shortness of breath when walking up stairs. Has had brief, infrequent fast heart beats, no syncope. Denies prior history of myocardial infarction or coronary stenting. Denies history of atrial fibrillation or arrhythmias.

## 2022-04-14 NOTE — PHYSICAL EXAM
[Normal Conjunctiva] : normal conjunctiva [Normal Gait] : normal gait [No Edema] : no edema [No Rash] : no rash [de-identified] : elderly woman, no acute distress [de-identified] : supple, no carotid bruits b/l [de-identified] : unlabored respirations, clear lung fields [de-identified] : JVP ~ 7 cm H20, RRR, s1, s2, no murmurs/rubs

## 2022-04-14 NOTE — ASSESSMENT
[FreeTextEntry1] : Assessment:\par Batool Escudero is a 76 year old woman, former remote cigarette smoker with past medical history of Hypothyroidism with recent ER visit for visual disturbances and word finding difficulties in February presents for cardiac consultation regarding possible cardioembolic cause of lacunar CVA noted on recent MRI brain.\par \par It appears that in February the patient had an event while eating dinner in which she had word finding difficulties & visual disturbances. She was evaluated by Neurology/Dr. Wright and MRI was suggestive of a left temporal lobe acute lacunar infarct, and was referred to cardiology office to rule out cardioembolic cause for CVA. ECG consistent with sinus rhythm, nonspecific ST abnormalities, recent labs unremarkable. Given concern for an acute lacunar infarct, recommend the following:\par \par Recommendations:\par [] Lacunar CVA: Will check echocardiogram to evaluate for structural heart disease, would recommend echo with IV contrast to ensure no LV thrombus. Will also arrange for 2 week cardiac event monitor to evaluate for atrial arrhythmias. Pending the above workup, the patient may require additional cardiac testing possible SWETHA for embolic cause of CVA, if deemed necessary by Neurologist. Recommended patient to check BP daily and keep log of this to ensure BP < 140/90. Recommended patient to avoid alcohol use as this increases risk for CVA and cardiac disease. \par [] Return to office: 1 month \par

## 2022-04-14 NOTE — REVIEW OF SYSTEMS
[Dyspnea on exertion] : dyspnea during exertion [Palpitations] : palpitations [Fever] : no fever [Chills] : no chills [Earache] : no earache [Sore Throat] : no sore throat [Chest Discomfort] : no chest discomfort [Lower Ext Edema] : no extremity edema [Abdominal Pain] : no abdominal pain [Joint Pain] : no joint pain [Rash] : no rash [Dizziness] : no dizziness [Confusion] : no confusion was observed [Easy Bruising] : no tendency for easy bruising

## 2022-04-19 ENCOUNTER — APPOINTMENT (OUTPATIENT)
Dept: NEUROLOGY | Facility: CLINIC | Age: 77
End: 2022-04-19
Payer: MEDICARE

## 2022-04-19 VITALS
DIASTOLIC BLOOD PRESSURE: 80 MMHG | HEART RATE: 72 BPM | SYSTOLIC BLOOD PRESSURE: 126 MMHG | HEIGHT: 67 IN | WEIGHT: 198 LBS | BODY MASS INDEX: 31.08 KG/M2

## 2022-04-19 DIAGNOSIS — G43.109 MIGRAINE WITH AURA, NOT INTRACTABLE, W/OUT STATUS MIGRAINOSUS: ICD-10-CM

## 2022-04-19 PROCEDURE — 99215 OFFICE O/P EST HI 40 MIN: CPT

## 2022-04-19 RX ORDER — ASPIRIN 325 MG/1
TABLET, FILM COATED ORAL
Refills: 0 | Status: ACTIVE | COMMUNITY

## 2022-04-19 NOTE — HISTORY OF PRESENT ILLNESS
[FreeTextEntry1] : This patient is seen for an office visit.  MRI of the brain and MRA studies of the brain and neck were performed on 4/4/2022.  There is evidence of a tiny punctate focus of restricted diffusion corresponding to ADC mapping in the left hippocampal body suggesting a tiny acute lacunar infarction in the left temporal lobe.  There are some scattered hyperintense foci in the periventricular deep white matter and there was a chronic microbleed in the right temporal occipital region.  MRI studies did not reveal significant stenosis.  The patient was referred to a cardiologist and is having ambulatory heart monitoring and further echocardiographic studies.  She did have another episode within the last day of a binocular visual disturbance as previously described and it was associated mild headache.  She denies recurrence of aphasia.  She has been taking aspirin 81 mg once daily.  She did not contact her PCP about starting a statin medication.\par \par She has a history of borderline elevation of cholesterol and hypothyroidism.  Family history positive for cerebral aneurysm migraine and benign brain tumor.

## 2022-04-19 NOTE — ASSESSMENT
[FreeTextEntry1] : Impression: This 76-year-old female patient has episodes of binocular visual disturbance with aphasia and headache.  In this setting following 1 of these episodes of brain MRI did reveal a tiny punctate lacunar infarct in the left hippocampus.  Suspect this may be a complicated migraine.  There are cases where diffusion positive findings can be seen in this setting.  Rule out cardioembolic event and the patient is seen the cardiologist in this regard.  MR angiogram did not reveal any vascular compromise.\par \par Recommendations: Continue cardiac work-up including ambulatory heart monitoring and I would also favor SWETHA.  Aspirin 81 mg daily and consider a statin.  The patient will have a fasting lipid profile.  Consider a calcium channel blocker such as verapamil for recurrent symptomatology suggesting migraine aura.  Office follow-up.\par

## 2022-05-02 ENCOUNTER — OUTPATIENT (OUTPATIENT)
Dept: OUTPATIENT SERVICES | Facility: HOSPITAL | Age: 77
LOS: 1 days | End: 2022-05-02
Payer: MEDICARE

## 2022-05-02 DIAGNOSIS — Z90.49 ACQUIRED ABSENCE OF OTHER SPECIFIED PARTS OF DIGESTIVE TRACT: Chronic | ICD-10-CM

## 2022-05-02 DIAGNOSIS — I63.81 OTHER CEREBRAL INFARCTION DUE TO OCCLUSION OR STENOSIS OF SMALL ARTERY: ICD-10-CM

## 2022-05-02 DIAGNOSIS — Z96.651 PRESENCE OF RIGHT ARTIFICIAL KNEE JOINT: Chronic | ICD-10-CM

## 2022-05-02 PROCEDURE — 93306 TTE W/DOPPLER COMPLETE: CPT | Mod: 26

## 2022-05-02 PROCEDURE — 93306 TTE W/DOPPLER COMPLETE: CPT

## 2022-05-05 ENCOUNTER — APPOINTMENT (OUTPATIENT)
Dept: CARDIOLOGY | Facility: CLINIC | Age: 77
End: 2022-05-05
Payer: MEDICARE

## 2022-05-05 ENCOUNTER — APPOINTMENT (OUTPATIENT)
Dept: CARDIOLOGY | Facility: CLINIC | Age: 77
End: 2022-05-05

## 2022-05-05 ENCOUNTER — NON-APPOINTMENT (OUTPATIENT)
Age: 77
End: 2022-05-05

## 2022-05-05 VITALS
RESPIRATION RATE: 20 BRPM | DIASTOLIC BLOOD PRESSURE: 80 MMHG | TEMPERATURE: 97.5 F | HEIGHT: 67 IN | OXYGEN SATURATION: 100 % | WEIGHT: 198 LBS | BODY MASS INDEX: 31.08 KG/M2 | SYSTOLIC BLOOD PRESSURE: 126 MMHG | HEART RATE: 61 BPM

## 2022-05-05 DIAGNOSIS — I47.2 VENTRICULAR TACHYCARDIA: ICD-10-CM

## 2022-05-05 DIAGNOSIS — I63.81 OTHER CEREBRAL INFARCTION DUE TO OCCLUSION OR STENOSIS OF SMALL ARTERY: ICD-10-CM

## 2022-05-05 DIAGNOSIS — I49.3 VENTRICULAR PREMATURE DEPOLARIZATION: ICD-10-CM

## 2022-05-05 PROCEDURE — 93000 ELECTROCARDIOGRAM COMPLETE: CPT

## 2022-05-05 PROCEDURE — 99213 OFFICE O/P EST LOW 20 MIN: CPT

## 2022-05-06 PROCEDURE — 93248 EXT ECG>7D<15D REV&INTERPJ: CPT

## 2022-05-07 PROBLEM — I47.2 NSVT (NONSUSTAINED VENTRICULAR TACHYCARDIA): Status: ACTIVE | Noted: 2022-05-07

## 2022-05-07 PROBLEM — I49.3 PVC (PREMATURE VENTRICULAR CONTRACTION): Status: ACTIVE | Noted: 2022-05-05

## 2022-05-07 RX ORDER — METOPROLOL SUCCINATE 25 MG/1
25 TABLET, EXTENDED RELEASE ORAL
Qty: 2 | Refills: 0 | Status: ACTIVE | COMMUNITY
Start: 2022-05-07 | End: 1900-01-01

## 2022-05-07 NOTE — HISTORY OF PRESENT ILLNESS
[FreeTextEntry1] : Batool Escudero is a 76 year old woman, former remote cigarette smoker with past medical history of Hypothyroidism with recent ER visit for visual disturbances and word finding difficulties in February presents for follow-up visit regarding evaluation for possible cardioembolic cause of lacunar CVA noted on recent MRI brain.\par \par Since her last visit the patient reports feeling well. Did recall having recurrent visual disturbances since her last visit. Denies palpitations or syncope. Denies chest pain or shortness of breath.

## 2022-05-07 NOTE — PHYSICAL EXAM
[Normal Conjunctiva] : normal conjunctiva [Normal Gait] : normal gait [No Edema] : no edema [No Rash] : no rash [de-identified] : elderly woman, no acute distress [de-identified] : JVP ~ 7 cm H20, RRR, s1, s2, no murmurs/rubs [de-identified] : supple, no carotid bruits b/l [de-identified] : unlabored respirations, clear lung fields

## 2022-05-07 NOTE — CARDIOLOGY SUMMARY
[de-identified] : ECG (4/7/22): normal sinus rhythm, left axis deviation, nonspecific ST abnormalities \par ECG (5/5/22): normal sinus rhythm, nonspecific ST abnormalities  [de-identified] : Cardiac Event Monitor (4/7-4/21/22): HR , Avg HR 67 BPM. PAC < 1 %. PVC < 1%. One 4 beat episode of NSVT. 8 episodes of SVT. No Pauses, AV block or Atrial fibrillation. [de-identified] : TTE (5/2022): LVEF 65-70%. Normal RV size and function, hypertrabeculated RV apex. Lipomatous interatrial septum. Agitated infection of saline contrast ("bubble study") with no obvious interatrial communication. Mild MR. Mild TR. No aortic valve stenosis. Mild AR. Mild UT. Dilated proximal ascending aorta (4.1 cm). No pericardial effusion.  [de-identified] : MRI Brain (4/4/22): Acute lacunar infarction in the left hippocampal body. No associated mass effect, bleeding, or shift. Patent central intracranial circulation. No hemodynamically significant stenosis or occlusion. Patent cervical vasculature. No hemodynamically significant stenosis or occlusion.\par

## 2022-05-07 NOTE — ASSESSMENT
[FreeTextEntry1] : Assessment:\par Batool Escudero is a 76 year old woman, former remote cigarette smoker with past medical history of Hypothyroidism with recent ER visit for visual disturbances and word finding difficulties in February presents for follow-up visit regarding evaluation for possible cardioembolic cause of lacunar CVA noted on recent MRI brain.\par \par Recent echocardiogram consistent with normal LV and RV systolic function, hypertrabeculated RV apex, lipomatous interatrial septum with no obvious shunting by "bubble study" and dilated proximal ascending aorta, no significant valvular disease. ECG today consistent with normal sinus rhythm. BP within normal limits.\par \par Recommendations:\par [] NSVT: Noted on cardiac event monitor, will check coronary CT angiogram to evaluate for underlying CAD given risk factors.\par [] Lacunar CVA vs complicated migraine: As per Neurology symptoms may be a complicated migraine but they recommend cardioembolic workup. Cardiac event monitor with no obvious atrial fibrillation noted, however 8 episodes of SVT were noted. Will have Cardiac EP review the event monitor strips. May consider SWETHA if no concerning atrial arrhythmia on event monitor, and if Neurology deems it is necessary as patient reports that her impression was that this was more of a migraine and not stroke. Recommended patient to avoid alcohol use as this increases risk for CVA and cardiac disease. Continue Aspirin\par [] Return to office: 2 months \par

## 2022-05-07 NOTE — REVIEW OF SYSTEMS
[Fever] : no fever [Chills] : no chills [Earache] : no earache [Sore Throat] : no sore throat [SOB] : no shortness of breath [Dyspnea on exertion] : not dyspnea during exertion [Chest Discomfort] : no chest discomfort [Lower Ext Edema] : no extremity edema [Palpitations] : no palpitations [Abdominal Pain] : no abdominal pain [Joint Pain] : no joint pain [Rash] : no rash [Dizziness] : no dizziness [Confusion] : no confusion was observed [Easy Bruising] : no tendency for easy bruising

## 2022-05-31 ENCOUNTER — APPOINTMENT (OUTPATIENT)
Dept: NEUROLOGY | Facility: CLINIC | Age: 77
End: 2022-05-31

## 2022-06-12 ENCOUNTER — NON-APPOINTMENT (OUTPATIENT)
Age: 77
End: 2022-06-12

## 2022-06-13 ENCOUNTER — NON-APPOINTMENT (OUTPATIENT)
Age: 77
End: 2022-06-13

## 2022-06-14 ENCOUNTER — NON-APPOINTMENT (OUTPATIENT)
Age: 77
End: 2022-06-14

## 2023-02-06 ENCOUNTER — NON-APPOINTMENT (OUTPATIENT)
Age: 78
End: 2023-02-06

## 2023-02-07 ENCOUNTER — APPOINTMENT (OUTPATIENT)
Dept: UROGYNECOLOGY | Facility: CLINIC | Age: 78
End: 2023-02-07
Payer: MEDICARE

## 2023-02-07 PROCEDURE — 99213 OFFICE O/P EST LOW 20 MIN: CPT

## 2023-03-11 ENCOUNTER — APPOINTMENT (OUTPATIENT)
Dept: ULTRASOUND IMAGING | Facility: CLINIC | Age: 78
End: 2023-03-11
Payer: MEDICARE

## 2023-03-11 ENCOUNTER — OUTPATIENT (OUTPATIENT)
Dept: OUTPATIENT SERVICES | Facility: HOSPITAL | Age: 78
LOS: 1 days | End: 2023-03-11
Payer: MEDICARE

## 2023-03-11 DIAGNOSIS — R33.9 RETENTION OF URINE, UNSPECIFIED: ICD-10-CM

## 2023-03-11 DIAGNOSIS — Z96.651 PRESENCE OF RIGHT ARTIFICIAL KNEE JOINT: Chronic | ICD-10-CM

## 2023-03-11 DIAGNOSIS — Z90.49 ACQUIRED ABSENCE OF OTHER SPECIFIED PARTS OF DIGESTIVE TRACT: Chronic | ICD-10-CM

## 2023-03-11 PROCEDURE — 76770 US EXAM ABDO BACK WALL COMP: CPT | Mod: 26

## 2023-03-11 PROCEDURE — 76770 US EXAM ABDO BACK WALL COMP: CPT

## 2023-07-06 ENCOUNTER — NON-APPOINTMENT (OUTPATIENT)
Age: 78
End: 2023-07-06

## 2023-07-13 ENCOUNTER — APPOINTMENT (OUTPATIENT)
Dept: UROGYNECOLOGY | Facility: CLINIC | Age: 78
End: 2023-07-13
Payer: MEDICARE

## 2023-07-13 VITALS
DIASTOLIC BLOOD PRESSURE: 83 MMHG | WEIGHT: 195 LBS | BODY MASS INDEX: 30.61 KG/M2 | HEIGHT: 67 IN | SYSTOLIC BLOOD PRESSURE: 128 MMHG

## 2023-07-13 PROCEDURE — 99214 OFFICE O/P EST MOD 30 MIN: CPT

## 2023-07-13 PROCEDURE — A4562: CPT

## 2023-07-13 NOTE — HISTORY OF PRESENT ILLNESS
[FreeTextEntry1] : Batool is a 78 year old with known history of rectocele and incomplete bladder emptying.  She was last seen in this office on 2/7/2023.  For her prolapse, she has previously used a GH LS 3, Cube #4 and Cube#5,  but stopped as prolapse reduction did not affect her voiding.  She feels her prolapse has worsened since February.  For her incomplete bladder emptying, she continues to self-catheterize 4-5 times per day, whenever she feels the bladder is full. No dysuria.  She is not measuring cath volumes.  She does not use Flomax. She feels she is able to empty her bowels completely however she does splint ~50% of the time.   Today she presents to the office to be refitted with a pessary.\par \par

## 2023-07-13 NOTE — PROCEDURE
[Good Fit] : fits well [Refit] : refit is not needed [Erosion] : no evidence of erosion [Erythema] : no erythema [Discharge] : no vaginal discharge [Infection] : no evidence of infection [Pessary Inserted] : inserted [None] : no bleeding [Medication Review] : Medicaiton Review: Patient verbalizes understanding of risks and benefits [Bladder Training] : Bladder Training: Patient given information with verbal understanding [FreeTextEntry1] : GH LS 3 1/4

## 2023-07-13 NOTE — DISCUSSION/SUMMARY
[FreeTextEntry1] : Pelvic prolapse:\par GH LS 3 1/4 inserted\par Reviewed pessary precautions and instructions\par \par Incomplete emptying:\par - ml\par -Continue with CIC 4-5 times per day\par -Advised pt to measure urine output\par Will RTO in 2 weeks or sooner if needed\par \par All questions answered to the patient's satisfaction.  She expressed understanding. She knows to contact the office with any questions or concerns.\par

## 2023-07-13 NOTE — PHYSICAL EXAM
[No Acute Distress] : in no acute distress [Well developed] : well developed [Well Nourished] : ~L well nourished [Good Hygeine] : demonstrates good hygeine [Oriented x3] : oriented to person, place, and time [Normal Memory] : ~T memory was ~L unimpaired [Normal Mood/Affect] : mood and affect are normal [Warm and Dry] : was warm and dry to touch [Normal Gait] : gait was normal [No Lesions] : no lesions were seen on the external genitalia [Vulvar Atrophy] : vulvar atrophy [Labia Majora] : were normal [Labia Minora] : were normal [Atrophy] : atrophy [Dry Mucosa] : dry mucosa [Rectocele] : a rectocele [No Bleeding] : there was no active vaginal bleeding [Anxiety] : patient is not anxious [Post Void Residual ____ml] : post void residual was [unfilled] ml

## 2023-07-27 ENCOUNTER — APPOINTMENT (OUTPATIENT)
Dept: UROGYNECOLOGY | Facility: CLINIC | Age: 78
End: 2023-07-27
Payer: MEDICARE

## 2023-07-27 VITALS
WEIGHT: 203 LBS | HEIGHT: 67 IN | DIASTOLIC BLOOD PRESSURE: 88 MMHG | BODY MASS INDEX: 31.86 KG/M2 | SYSTOLIC BLOOD PRESSURE: 135 MMHG

## 2023-07-27 PROCEDURE — 99213 OFFICE O/P EST LOW 20 MIN: CPT

## 2023-09-06 ENCOUNTER — APPOINTMENT (OUTPATIENT)
Dept: UROGYNECOLOGY | Facility: CLINIC | Age: 78
End: 2023-09-06
Payer: MEDICARE

## 2023-09-06 PROCEDURE — 99214 OFFICE O/P EST MOD 30 MIN: CPT

## 2023-09-06 NOTE — PROCEDURE
[Good Fit] : fits well [Refit] : refit needed [Erosion] : no evidence of erosion [Erythema] : no erythema [Discharge] : no vaginal discharge [Infection] : no evidence of infection [Pessary Inserted] : inserted [Pessary Washed] : washed [H2O] : H2O [None] : no bleeding [FreeTextEntry1] : Kostas HINOJOSA # 3 1/4 [FreeTextEntry8] : Reinforced daily pericare.

## 2023-09-06 NOTE — HISTORY OF PRESENT ILLNESS
[FreeTextEntry1] : Batool, 77 y/o presents to the office for follow up on pelvic prolapse, hx of incomplete emptying and Rectocele, supported with Kostas HINOJOSA#3 1/4.  She is very happy with the support.   She continues to perform CIC 4-5 times per day.  She is moving her bowels without difficulty.  No vaginal bleeding.  She was on vacation in [place] and doing well up until this past Friday, pessary was coming down and she felt it was about to fall out and she removed it.  She would like pessary reinsertion.  Denies being constipated and feels with the pessary she is able to urinate on her own and then self-catheterize.

## 2023-09-06 NOTE — DISCUSSION/SUMMARY
[FreeTextEntry1] : Rectocele: -Continue with Gelrush LS # 3 1/4 -Precaution and instructions were reviewed. -Briefly discussed surgical option.  PT will think about it.  Incomplete bladder emptying: -Continue CIC 4-5 times per day   Follow up in 2-3 months or sooner if needed.  IF pt have any problems/concern to call office.  PT aware and agrees.

## 2023-09-06 NOTE — PHYSICAL EXAM
[No Acute Distress] : in no acute distress [Well developed] : well developed [Well Nourished] : ~L well nourished [Good Hygeine] : demonstrates good hygeine [Oriented x3] : oriented to person, place, and time [Normal Memory] : ~T memory was ~L unimpaired [Normal Mood/Affect] : mood and affect are normal [Anxiety] : patient is not anxious [Warm and Dry] : was warm and dry to touch [Normal Gait] : gait was normal [Vulvar Atrophy] : vulvar atrophy [Labia Majora] : were normal [Labia Minora] : were normal [Dry Mucosa] : dry mucosa [Rectocele] : a rectocele [Discharge] : a  ~M vaginal discharge was present [Scant] : scant [White] : white [Thin] : thin [No Bleeding] : there was no active vaginal bleeding [de-identified] : prolapse protruding out of vaginal opening.

## 2023-10-01 PROBLEM — I63.81 LACUNAR INFARCTION: Status: ACTIVE | Noted: 2022-04-07

## 2023-10-24 ENCOUNTER — APPOINTMENT (OUTPATIENT)
Dept: RADIOLOGY | Facility: HOSPITAL | Age: 78
End: 2023-10-24
Payer: MEDICARE

## 2023-10-24 ENCOUNTER — OUTPATIENT (OUTPATIENT)
Dept: OUTPATIENT SERVICES | Facility: HOSPITAL | Age: 78
LOS: 1 days | End: 2023-10-24
Payer: MEDICARE

## 2023-10-24 DIAGNOSIS — Z00.8 ENCOUNTER FOR OTHER GENERAL EXAMINATION: ICD-10-CM

## 2023-10-24 DIAGNOSIS — Z90.49 ACQUIRED ABSENCE OF OTHER SPECIFIED PARTS OF DIGESTIVE TRACT: Chronic | ICD-10-CM

## 2023-10-24 DIAGNOSIS — Z96.651 PRESENCE OF RIGHT ARTIFICIAL KNEE JOINT: Chronic | ICD-10-CM

## 2023-10-24 PROCEDURE — 73521 X-RAY EXAM HIPS BI 2 VIEWS: CPT | Mod: 26

## 2023-10-24 PROCEDURE — 73020 X-RAY EXAM OF SHOULDER: CPT | Mod: 26,50

## 2023-10-24 PROCEDURE — 73521 X-RAY EXAM HIPS BI 2 VIEWS: CPT

## 2023-10-24 PROCEDURE — 73020 X-RAY EXAM OF SHOULDER: CPT

## 2023-10-27 ENCOUNTER — APPOINTMENT (OUTPATIENT)
Dept: UROGYNECOLOGY | Facility: CLINIC | Age: 78
End: 2023-10-27
Payer: MEDICARE

## 2023-10-27 PROCEDURE — 99213 OFFICE O/P EST LOW 20 MIN: CPT

## 2024-01-10 ENCOUNTER — APPOINTMENT (OUTPATIENT)
Dept: UROGYNECOLOGY | Facility: CLINIC | Age: 79
End: 2024-01-10
Payer: MEDICARE

## 2024-01-10 PROCEDURE — A4562: CPT

## 2024-01-10 PROCEDURE — 99214 OFFICE O/P EST MOD 30 MIN: CPT

## 2024-01-18 NOTE — HISTORY OF PRESENT ILLNESS
[FreeTextEntry1] : Batool is a 77 y/o with known history of POP, rectocele, and incomplete emptying supported by a  LS 3 1/4.  She presents today with the pessary out explaining it came out while bearing down to have a bowel movement.  This has happened before.  She has been experiencing rectal bleeding, no vaginal bleeding.  She continues to perform CIC 4-5 times per day.  No UTI symptoms.

## 2024-01-18 NOTE — PHYSICAL EXAM
[No Acute Distress] : in no acute distress [Well developed] : well developed [Well Nourished] : ~L well nourished [Good Hygeine] : demonstrates good hygeine [Oriented x3] : oriented to person, place, and time [Normal Memory] : ~T memory was ~L unimpaired [Normal Mood/Affect] : mood and affect are normal [Warm and Dry] : was warm and dry to touch [Normal Gait] : gait was normal [Vulvar Atrophy] : vulvar atrophy [Labia Majora] : were normal [Labia Minora] : were normal [Dry Mucosa] : dry mucosa [Rectocele] : a rectocele [Scant] : scant [White] : white [Thin] : thin [No Bleeding] : there was no active vaginal bleeding [Atrophy] : atrophy [Anxiety] : patient is not anxious

## 2024-01-18 NOTE — DISCUSSION/SUMMARY
[FreeTextEntry1] : #Rectocele: -Refit with GH LS 3.5 -Precaution and instructions were reviewed.  Patient instructed how to recognize if the pessary is fitting properly.    #Incomplete bladder emptying: -Continue CIC 4-5 times per day  #Rectal bleeding: -Patient has been seen by GI and has a colonoscopy/endoscopy scheduled for two weeks from now  #Dispo: -Patient is interested in learning more about her POP surgical treatment options.   -She will RTO on 1/24/24 for pessary check and removal, followed by exam and surgical options discussion on 2/14/24 with the MD.  All questions answered to the patient's satisfaction.  She expressed understanding. She knows to contact the office with any questions or concerns.

## 2024-01-18 NOTE — PROCEDURE
[Refit] : refit needed [Discharge] : there is vaginal discharge [Pessary Inserted] : inserted [None] : no bleeding [Fluid Management] : Fluid Management: patient verbalizes understanding 6-10 cups per day [Bowel Management] : Bowel Management: patient verbalizes understanding of daily dietary fiber intake [Bladder Training] : Bladder Training: Patient given information with verbal understanding [Good Fit] : fit is not good [Erosion] : no evidence of erosion [Erythema] : no erythema [Infection] : no evidence of infection [FreeTextEntry1] : Kostas HINOJOSA # 3 1/4 [de-identified] : REY HINOJOSA # 3.5 [FreeTextEntry8] : Reinforced daily pericare.

## 2024-01-24 ENCOUNTER — APPOINTMENT (OUTPATIENT)
Dept: UROGYNECOLOGY | Facility: CLINIC | Age: 79
End: 2024-01-24
Payer: MEDICARE

## 2024-01-24 PROCEDURE — 99213 OFFICE O/P EST LOW 20 MIN: CPT

## 2024-01-24 NOTE — PHYSICAL EXAM
[No Acute Distress] : in no acute distress [Well developed] : well developed [Well Nourished] : ~L well nourished [Good Hygeine] : demonstrates good hygeine [Oriented x3] : oriented to person, place, and time [Normal Memory] : ~T memory was ~L unimpaired [Normal Mood/Affect] : mood and affect are normal [Warm and Dry] : was warm and dry to touch [Normal Gait] : gait was normal [Vulvar Atrophy] : vulvar atrophy [Labia Majora] : were normal [Labia Minora] : were normal [Atrophy] : atrophy [Dry Mucosa] : dry mucosa [Rectocele] : a rectocele [No Bleeding] : there was no active vaginal bleeding [Anxiety] : patient is not anxious

## 2024-01-24 NOTE — DISCUSSION/SUMMARY
[FreeTextEntry1] : #Rectocele: -Continue with GH LS 3.5 after 02/14/24 appointment.  Pessary left out at this visit.  -Precaution and instructions were reviewed.   #Incomplete bladder emptying: -Continue CIC 4-5 times per day  #Dispo: -Patient is interested in learning more about her POP surgical treatment options.   -She will RTO on 2/14/24 for exam and surgical options discussion with the MD.  All questions answered to the patient's satisfaction.  She expressed understanding. She knows to contact the office with any questions or concerns.

## 2024-01-24 NOTE — HISTORY OF PRESENT ILLNESS
[FreeTextEntry1] : Batool is a 77 y/o with known history of POP, rectocele, and incomplete emptying supported by a  LS 3 1/2.  She was refit at the last visit 01/10/24 because  LS 3 1/4 fell out while having BM.  She presents today for follow-up and removal of pessary for surgical discussion on 2/14/24.  She is happy with the support that the pessary is providing and has no acute complaints today. Pt denies vaginal bleeding.  Pt is voiding without issues. Pt has hx of constipation that she recently had colonoscopy with GI doctor and she will f/u. She continues to perform CIC 4-5 times per day.  No UTI symptoms.

## 2024-01-24 NOTE — PROCEDURE
[Good Fit] : fits well [Pessary Out] : removed [None] : no bleeding [Fluid Management] : Fluid Management: patient verbalizes understanding 6-10 cups per day [Bowel Management] : Bowel Management: patient verbalizes understanding of daily dietary fiber intake [Bladder Training] : Bladder Training: Patient given information with verbal understanding [Refit] : refit is not needed [Erosion] : no evidence of erosion [Erythema] : no erythema [Discharge] : no vaginal discharge [Infection] : no evidence of infection [Pessary Washed] : washed [FreeTextEntry1] : Kostas HINOJOSA # 3 1/2 [FreeTextEntry8] : Reinforced daily pericare.

## 2024-02-14 ENCOUNTER — APPOINTMENT (OUTPATIENT)
Dept: UROGYNECOLOGY | Facility: CLINIC | Age: 79
End: 2024-02-14
Payer: MEDICARE

## 2024-02-14 VITALS
HEART RATE: 77 BPM | BODY MASS INDEX: 31.86 KG/M2 | HEIGHT: 67 IN | SYSTOLIC BLOOD PRESSURE: 133 MMHG | DIASTOLIC BLOOD PRESSURE: 80 MMHG | WEIGHT: 203 LBS

## 2024-02-14 DIAGNOSIS — N81.4 UTEROVAGINAL PROLAPSE, UNSPECIFIED: ICD-10-CM

## 2024-02-14 PROCEDURE — 99213 OFFICE O/P EST LOW 20 MIN: CPT

## 2024-02-22 ENCOUNTER — APPOINTMENT (OUTPATIENT)
Dept: ULTRASOUND IMAGING | Facility: CLINIC | Age: 79
End: 2024-02-22
Payer: MEDICARE

## 2024-02-22 ENCOUNTER — RESULT REVIEW (OUTPATIENT)
Age: 79
End: 2024-02-22

## 2024-02-22 PROCEDURE — 76830 TRANSVAGINAL US NON-OB: CPT

## 2024-02-22 PROCEDURE — 76856 US EXAM PELVIC COMPLETE: CPT

## 2024-03-14 ENCOUNTER — RESULT CHARGE (OUTPATIENT)
Age: 79
End: 2024-03-14

## 2024-03-15 ENCOUNTER — OUTPATIENT (OUTPATIENT)
Dept: OUTPATIENT SERVICES | Facility: HOSPITAL | Age: 79
LOS: 1 days | End: 2024-03-15
Payer: MEDICARE

## 2024-03-15 ENCOUNTER — APPOINTMENT (OUTPATIENT)
Dept: UROGYNECOLOGY | Facility: CLINIC | Age: 79
End: 2024-03-15
Payer: MEDICARE

## 2024-03-15 DIAGNOSIS — Z01.818 ENCOUNTER FOR OTHER PREPROCEDURAL EXAMINATION: ICD-10-CM

## 2024-03-15 DIAGNOSIS — Z96.651 PRESENCE OF RIGHT ARTIFICIAL KNEE JOINT: Chronic | ICD-10-CM

## 2024-03-15 DIAGNOSIS — Z90.49 ACQUIRED ABSENCE OF OTHER SPECIFIED PARTS OF DIGESTIVE TRACT: Chronic | ICD-10-CM

## 2024-03-15 PROCEDURE — 51729 CYSTOMETROGRAM W/VP&UP: CPT

## 2024-03-15 PROCEDURE — 51797 INTRAABDOMINAL PRESSURE TEST: CPT

## 2024-03-15 PROCEDURE — 51729 CYSTOMETROGRAM W/VP&UP: CPT | Mod: 26

## 2024-03-15 PROCEDURE — 51784 ANAL/URINARY MUSCLE STUDY: CPT | Mod: 26

## 2024-03-15 PROCEDURE — 51784 ANAL/URINARY MUSCLE STUDY: CPT

## 2024-03-15 PROCEDURE — 81003 URINALYSIS AUTO W/O SCOPE: CPT | Mod: QW

## 2024-03-15 PROCEDURE — 51797 INTRAABDOMINAL PRESSURE TEST: CPT | Mod: 26

## 2024-03-18 VITALS — SYSTOLIC BLOOD PRESSURE: 121 MMHG | HEART RATE: 67 BPM | DIASTOLIC BLOOD PRESSURE: 81 MMHG

## 2024-03-18 LAB
BILIRUB UR QL STRIP: NEGATIVE
CLARITY UR: CLEAR
COLLECTION METHOD: NORMAL
GLUCOSE UR-MCNC: NEGATIVE
HCG UR QL: 0.2 EU/DL
HGB UR QL STRIP.AUTO: NEGATIVE
KETONES UR-MCNC: NEGATIVE
LEUKOCYTE ESTERASE UR QL STRIP: NORMAL
NITRITE UR QL STRIP: POSITIVE
PH UR STRIP: 5.5
PROT UR STRIP-MCNC: NEGATIVE
SP GR UR STRIP: 1.01

## 2024-03-19 NOTE — HISTORY OF PRESENT ILLNESS
[FreeTextEntry1] : Batool is a 77yo with known history of pelvic organ prolapse and rectocele supported with  LS #3 1/2 along with incomplete emptying for which she performs CIC 4-5 times per day, who presents today for follow up. She was last seen on 1/24, at which time pessary was removed secondary to interest in discussing surgical options and plan for repeat POP-Q evaluation. She does report incontinence episodes, unable to associate it with YANI or UUI. She reports she is no longer sexually active and does not desire to be in the future.

## 2024-03-19 NOTE — PHYSICAL EXAM
[Chaperone Present] : A chaperone was present in the examining room during all aspects of the physical examination [No Acute Distress] : in no acute distress [Well developed] : well developed [Well Nourished] : ~L well nourished [Labia Majora] : were normal [Labia Minora] : were normal [Rectocele] : a rectocele [Uterine Prolapse] : uterine prolapse [Aa ____] : Aa [unfilled] [Ba ____] : Ba [unfilled] [C ____] : C [unfilled] [GH ____] : GH [unfilled] [PB ____] : PB [unfilled] [TVL ____] : TVL  [unfilled] [Ap ____] : Ap [unfilled] [Bp ____] : Bp [unfilled] [D ____] : D [unfilled] [Normal] : no abnormalities [Tenderness] : ~T no ~M abdominal tenderness observed [Distended] : not distended

## 2024-03-19 NOTE — DISCUSSION/SUMMARY
[FreeTextEntry1] : We reviewed management options for her prolapse including: observation, pelvic floor exercises with and without PT, pessary, and surgical management. We reviewed various surgical management options including vaginal, laparoscopic/robotic, abdominal procedures. We reviewed procedures involving hysterectomy as well as uterine sparing procedures. We also reviewed both mesh and non-mesh options. We also discussed vaginal shortening with colpocleisis, of which she would like to consider, given that she is no longer sexually active and does not desire to be in the future. She understands this is permanent and irreversible. Given desire to proceed with surgery, recommend proceeding with Pelvic US to assess uterus along with UDS. She last underwent UDS in 2018. Due to new onset incontinence, will repeat at this time. She will continue to perform CIC as she has been, about 4-5x per day. Reviewed overall postoperative recovery in general of about 6 weeks. All questions answered and addressed.

## 2024-03-20 ENCOUNTER — APPOINTMENT (OUTPATIENT)
Dept: UROGYNECOLOGY | Facility: CLINIC | Age: 79
End: 2024-03-20
Payer: MEDICARE

## 2024-03-20 VITALS
BODY MASS INDEX: 31.22 KG/M2 | HEART RATE: 101 BPM | HEIGHT: 68 IN | WEIGHT: 206 LBS | SYSTOLIC BLOOD PRESSURE: 134 MMHG | DIASTOLIC BLOOD PRESSURE: 85 MMHG

## 2024-03-20 PROCEDURE — 99214 OFFICE O/P EST MOD 30 MIN: CPT

## 2024-03-21 NOTE — DISCUSSION/SUMMARY
[FreeTextEntry1] : 1) POP We reviewed management options for her prolapse including: observation, pelvic floor exercises, pessary, and surgical management. We reviewed various surgical management options including vaginal, laparoscopic/robotic, abdominal procedures. We reviewed procedures involving hysterectomy as well as uterine sparing procedures. We also reviewed both mesh and non-mesh options.  We also discussed vaginal shortening with colpocleisis.  She is no longer sexually active (and does not desire to be in the future).  She understands this is permanent and irreversible.   2) YANI We reviewed management options for stress urinary incontinence including: observation, pelvic floor exercises, continence devices, periurethral bulking agents, and surgical management. We discussed surgical management options and written information on stress urinary incontinence including management options from [facility] was provided to her and reviewed. We reviewed risks and benefits of each procedure. Patient is considering undergoing midurethral sling and will let us know at the time of her preoperative visit.

## 2024-03-21 NOTE — HISTORY OF PRESENT ILLNESS
[FreeTextEntry1] : Patient is a 78F with Stage III prolapse managed with a pessary here for follow up.  She continues to do CIC 4-5x per day. She is currently not sexually active and is interested in surgical management of her prolapse.  UDS (3/2024): intermediate 500 mL, +-500 mL, no DO,  cc. Pelvic US (2/2024): 6.7 x 3.7 x 2.9 cm uterus, endometrium 3 mm; left ovary 2.9 x 1.5 x 1.5 cm with a simple cyst 1.5 x 1.2 x 1.3 cm.; right ovary wnl

## 2024-04-17 ENCOUNTER — NON-APPOINTMENT (OUTPATIENT)
Age: 79
End: 2024-04-17

## 2024-04-29 ENCOUNTER — OUTPATIENT (OUTPATIENT)
Dept: OUTPATIENT SERVICES | Facility: HOSPITAL | Age: 79
LOS: 1 days | End: 2024-04-29
Payer: MEDICARE

## 2024-04-29 ENCOUNTER — APPOINTMENT (OUTPATIENT)
Dept: RADIOLOGY | Facility: HOSPITAL | Age: 79
End: 2024-04-29

## 2024-04-29 DIAGNOSIS — Z96.651 PRESENCE OF RIGHT ARTIFICIAL KNEE JOINT: Chronic | ICD-10-CM

## 2024-04-29 DIAGNOSIS — Z00.8 ENCOUNTER FOR OTHER GENERAL EXAMINATION: ICD-10-CM

## 2024-04-29 DIAGNOSIS — Z90.49 ACQUIRED ABSENCE OF OTHER SPECIFIED PARTS OF DIGESTIVE TRACT: Chronic | ICD-10-CM

## 2024-04-29 PROCEDURE — 73502 X-RAY EXAM HIP UNI 2-3 VIEWS: CPT | Mod: 26,RT

## 2024-04-29 PROCEDURE — 73502 X-RAY EXAM HIP UNI 2-3 VIEWS: CPT

## 2024-05-02 ENCOUNTER — OUTPATIENT (OUTPATIENT)
Dept: OUTPATIENT SERVICES | Facility: HOSPITAL | Age: 79
LOS: 1 days | End: 2024-05-02
Payer: MEDICARE

## 2024-05-02 VITALS
RESPIRATION RATE: 18 BRPM | HEIGHT: 68 IN | SYSTOLIC BLOOD PRESSURE: 122 MMHG | OXYGEN SATURATION: 97 % | TEMPERATURE: 98 F | DIASTOLIC BLOOD PRESSURE: 75 MMHG | WEIGHT: 205.91 LBS | HEART RATE: 69 BPM

## 2024-05-02 DIAGNOSIS — N81.11 CYSTOCELE, MIDLINE: ICD-10-CM

## 2024-05-02 DIAGNOSIS — E03.9 HYPOTHYROIDISM, UNSPECIFIED: ICD-10-CM

## 2024-05-02 DIAGNOSIS — Z01.818 ENCOUNTER FOR OTHER PREPROCEDURAL EXAMINATION: ICD-10-CM

## 2024-05-02 DIAGNOSIS — Z96.651 PRESENCE OF RIGHT ARTIFICIAL KNEE JOINT: Chronic | ICD-10-CM

## 2024-05-02 DIAGNOSIS — Z29.9 ENCOUNTER FOR PROPHYLACTIC MEASURES, UNSPECIFIED: ICD-10-CM

## 2024-05-02 DIAGNOSIS — Z90.49 ACQUIRED ABSENCE OF OTHER SPECIFIED PARTS OF DIGESTIVE TRACT: Chronic | ICD-10-CM

## 2024-05-02 LAB
ANION GAP SERPL CALC-SCNC: 15 MMOL/L — SIGNIFICANT CHANGE UP (ref 5–17)
BLD GP AB SCN SERPL QL: NEGATIVE — SIGNIFICANT CHANGE UP
BUN SERPL-MCNC: 18 MG/DL — SIGNIFICANT CHANGE UP (ref 7–23)
CALCIUM SERPL-MCNC: 9.6 MG/DL — SIGNIFICANT CHANGE UP (ref 8.4–10.5)
CHLORIDE SERPL-SCNC: 102 MMOL/L — SIGNIFICANT CHANGE UP (ref 96–108)
CO2 SERPL-SCNC: 22 MMOL/L — SIGNIFICANT CHANGE UP (ref 22–31)
CREAT SERPL-MCNC: 0.66 MG/DL — SIGNIFICANT CHANGE UP (ref 0.5–1.3)
EGFR: 91 ML/MIN/1.73M2 — SIGNIFICANT CHANGE UP
GLUCOSE SERPL-MCNC: 95 MG/DL — SIGNIFICANT CHANGE UP (ref 70–99)
HCT VFR BLD CALC: 37.1 % — SIGNIFICANT CHANGE UP (ref 34.5–45)
HGB BLD-MCNC: 12.1 G/DL — SIGNIFICANT CHANGE UP (ref 11.5–15.5)
MCHC RBC-ENTMCNC: 28.5 PG — SIGNIFICANT CHANGE UP (ref 27–34)
MCHC RBC-ENTMCNC: 32.6 GM/DL — SIGNIFICANT CHANGE UP (ref 32–36)
MCV RBC AUTO: 87.5 FL — SIGNIFICANT CHANGE UP (ref 80–100)
NRBC # BLD: 0 /100 WBCS — SIGNIFICANT CHANGE UP (ref 0–0)
PLATELET # BLD AUTO: 259 K/UL — SIGNIFICANT CHANGE UP (ref 150–400)
POTASSIUM SERPL-MCNC: 4.6 MMOL/L — SIGNIFICANT CHANGE UP (ref 3.5–5.3)
POTASSIUM SERPL-SCNC: 4.6 MMOL/L — SIGNIFICANT CHANGE UP (ref 3.5–5.3)
RBC # BLD: 4.24 M/UL — SIGNIFICANT CHANGE UP (ref 3.8–5.2)
RBC # FLD: 13.1 % — SIGNIFICANT CHANGE UP (ref 10.3–14.5)
RH IG SCN BLD-IMP: POSITIVE — SIGNIFICANT CHANGE UP
SODIUM SERPL-SCNC: 139 MMOL/L — SIGNIFICANT CHANGE UP (ref 135–145)
WBC # BLD: 5.48 K/UL — SIGNIFICANT CHANGE UP (ref 3.8–10.5)
WBC # FLD AUTO: 5.48 K/UL — SIGNIFICANT CHANGE UP (ref 3.8–10.5)

## 2024-05-02 PROCEDURE — 86850 RBC ANTIBODY SCREEN: CPT

## 2024-05-02 PROCEDURE — G0463: CPT

## 2024-05-02 PROCEDURE — 86901 BLOOD TYPING SEROLOGIC RH(D): CPT

## 2024-05-02 PROCEDURE — 85027 COMPLETE CBC AUTOMATED: CPT

## 2024-05-02 PROCEDURE — 80048 BASIC METABOLIC PNL TOTAL CA: CPT

## 2024-05-02 PROCEDURE — 86900 BLOOD TYPING SEROLOGIC ABO: CPT

## 2024-05-02 PROCEDURE — 36415 COLL VENOUS BLD VENIPUNCTURE: CPT

## 2024-05-02 RX ORDER — CEFOTETAN DISODIUM 1 G
2 VIAL (EA) INJECTION ONCE
Refills: 0 | Status: DISCONTINUED | OUTPATIENT
Start: 2024-05-23 | End: 2024-05-24

## 2024-05-02 NOTE — H&P PST ADULT - PROBLEM SELECTOR PLAN 1
Pt. scheduled for Anterior, Posterior and Enterocele Colporrhaphy Repair, Partial Vaginectomy, Cystoscopy possible Sling with Dr. Bailey on 5/23/24.  Pre-op instructions given, all questions answered.  Labs: CBC, BMP, T&S     UC to be performed by Dr. Bailey

## 2024-05-02 NOTE — H&P PST ADULT - GENITOURINARY MALE
[facility] DEPARTMENT OF OPHTHALMOLOGY - INITIAL ADULT CONSULT  -----------------------------------------------------------------------------------------------------------------  Wilmer Lopez MD  PGY 2  Contact on Teams  -----------------------------------------------------------------------------------------------------------------    HPI:  Patient is a 49 y/o male PMH DM2, Bilateral BKA, ESRD (on HD MWF), last dialyzed yesterday transferred from [facility] emergently for evaluation of evaluation of right finger swelling/pain. Patient reports history of right finger pain after he had a fall one year ago. Patient had a wound on her second/middle finger who he was seeing a hand surgeon for outpatient but unable ultimately to continue seeing due to insurance issues. Patient reports his middle finger developed increased swelling and became black in color about two weeks ago. Denies fevers.   Patient transferred to [facility] for further evaluation and emergent OR. Patient received broad spectrum Abx of Zosyn/vanco/clindamycin at [place].   Interpretor phone used for translation with patient. ID# [id number]       (10 Sudeep 2023 15:44)    Interval History: Patient states that he has had no vision in his left eye for 4 months. He saw his eye doctor 3 months ago, Dr. Gil Pettit ([phone]). Patient reports that he does not know why he lost vision., Patient states that the left eye does bother him at times, but not right now.     PAST MEDICAL & SURGICAL HISTORY:  ESRD on dialysis      H/O hypotension      Diabetes mellitus      S/P BKA (below knee amputation) bilateral      AV fistula        Past Ocular History: CE/PCIOL 3 years ago per patient. left eye  Ophthalmic Medications: None  FAMILY HISTORY:      MEDICATIONS  (STANDING):  acetaminophen     Tablet .. 975 milliGRAM(s) Oral every 8 hours  chlorhexidine 2% Cloths 1 Application(s) Topical <User Schedule>  chlorhexidine 2% Cloths 1 Application(s) Topical daily  dextrose 5%. 1000 milliLiter(s) (50 mL/Hr) IV Continuous <Continuous>  dextrose 5%. 1000 milliLiter(s) (100 mL/Hr) IV Continuous <Continuous>  dextrose 5%. 1000 milliLiter(s) (100 mL/Hr) IV Continuous <Continuous>  dextrose 5%. 1000 milliLiter(s) (50 mL/Hr) IV Continuous <Continuous>  dextrose 50% Injectable 25 Gram(s) IV Push once  dextrose 50% Injectable 12.5 Gram(s) IV Push once  dextrose 50% Injectable 25 Gram(s) IV Push once  dextrose 50% Injectable 25 Gram(s) IV Push once  dextrose 50% Injectable 12.5 Gram(s) IV Push once  dextrose 50% Injectable 25 Gram(s) IV Push once  epoetin deidre-epbx (RETACRIT) Injectable 51750 Unit(s) IV Push <User Schedule>  famotidine    Tablet 20 milliGRAM(s) Oral every 12 hours  glucagon  Injectable 1 milliGRAM(s) IntraMuscular once  glucagon  Injectable 1 milliGRAM(s) IntraMuscular once  heparin   Injectable 5000 Unit(s) SubCutaneous every 8 hours  insulin glargine Injectable (LANTUS) 5 Unit(s) SubCutaneous at bedtime  insulin lispro (ADMELOG) corrective regimen sliding scale   SubCutaneous three times a day before meals  midodrine. 10 milliGRAM(s) Oral three times a day  multivitamin 1 Tablet(s) Oral daily  piperacillin/tazobactam IVPB.. 3.375 Gram(s) IV Intermittent every 12 hours  vancomycin  IVPB 500 milliGRAM(s) IV Intermittent once    MEDICATIONS  (PRN):  aluminum hydroxide/magnesium hydroxide/simethicone Suspension 30 milliLiter(s) Oral four times a day PRN Indigestion  dextrose Oral Gel 15 Gram(s) Oral once PRN Blood Glucose LESS THAN 70 milliGRAM(s)/deciliter  dextrose Oral Gel 15 Gram(s) Oral once PRN Blood Glucose LESS THAN 70 milliGRAM(s)/deciliter  magnesium hydroxide Suspension 30 milliLiter(s) Oral daily PRN Constipation  oxyCODONE    IR 10 milliGRAM(s) Oral every 4 hours PRN Severe Pain (7 - 10)    Allergies & Intolerances:     Review of Systems:  Constitutional: No fever, chills  Eyes: No blurry vision, flashes, floaters, FBS, erythema, discharge, double vision, OU  Neuro: No tremors  Cardiovascular: No chest pain, palpitations  Respiratory: No SOB, no cough  GI: No nausea, vomiting, abdominal pain  : No dysuria  Skin: no rash  Psych: no depression  Endocrine: no polyuria, polydipsia  Heme/lymph: no swelling    VITALS: T(C): 36.8 (01-11-23 @ 18:08)  T(F): 98.3 (01-11-23 @ 18:08), Max: 99.8 (01-11-23 @ 04:30)  HR: 91 (01-11-23 @ 18:08) (71 - 92)  BP: 106/46 (01-11-23 @ 18:08) (90/43 - 113/57)  RR:  (17 - 23)  SpO2:  (98% - 100%)  Wt(kg): --  General: AAO x 3, appropriate mood and affect    Ophthalmology Exam:  Visual acuity (cc): 20/70 OD PHNI; NLP OS  Pupils: PERRL OU, no APD  Ttono: 16 OU  Extraocular movements (EOMs): Full OU, no pain, no diplopia  Confrontational Visual Field (CVF): Full OU  Color Plates: 12/12 OU    Pen Light Exam (PLE)  External: Flat OU  Lids/Lashes/Lacrimal Ducts: Flat OU    Sclera/Conjunctiva: W+Q OU  Cornea: Cl OU  Anterior Chamber: D+F OU    Iris: Flat OU  Lens: Cl OU    Fundus Exam: dilated with 1% tropicamide and 2.5% phenylephrine  Approval obtained from primary team for dilation  Patient aware that pupils can remained dilated for at least 4-6 hours  Exam performed with 20D lens    Vitreous: wnl OU  Disc, cup/disc: sharp and pink, 0.4 OU  Macula: wnl OU  Vessels: wnl OU  Periphery: wnl OU    Labs/Imaging:  ***   [facility] DEPARTMENT OF OPHTHALMOLOGY - INITIAL ADULT CONSULT  -----------------------------------------------------------------------------------------------------------------  Wilmer Lopez MD  PGY 2  Contact on Teams  -----------------------------------------------------------------------------------------------------------------    HPI:  Patient is a 49 y/o male PMH DM2, Bilateral BKA, ESRD (on HD MWF), last dialyzed yesterday transferred from [facility] emergently for evaluation of evaluation of right finger swelling/pain. Patient reports history of right finger pain after he had a fall one year ago. Patient had a wound on her second/middle finger who he was seeing a hand surgeon for outpatient but unable ultimately to continue seeing due to insurance issues. Patient reports his middle finger developed increased swelling and became black in color about two weeks ago. Denies fevers.   Patient transferred to [facility] for further evaluation and emergent OR. Patient received broad spectrum Abx of Zosyn/vanco/clindamycin at [facility].   Interpretor phone used for translation with patient. ID# [id number]       (10 Sudeep 2023 15:44)    Interval History: Patient states that he has had no vision in his left eye for 4 months. He saw his eye doctor 3 months ago, Dr. Gil Pettit ([phone]). Patient reports that he does not know why he lost vision., Patient states that the left eye does bother him at times, but not right now.     PAST MEDICAL & SURGICAL HISTORY:  ESRD on dialysis      H/O hypotension      Diabetes mellitus      S/P BKA (below knee amputation) bilateral      AV fistula        Past Ocular History: CE/PCIOL 3 years ago per patient. left eye  Ophthalmic Medications: None  FAMILY HISTORY:      MEDICATIONS  (STANDING):  acetaminophen     Tablet .. 975 milliGRAM(s) Oral every 8 hours  chlorhexidine 2% Cloths 1 Application(s) Topical <User Schedule>  chlorhexidine 2% Cloths 1 Application(s) Topical daily  dextrose 5%. 1000 milliLiter(s) (50 mL/Hr) IV Continuous <Continuous>  dextrose 5%. 1000 milliLiter(s) (100 mL/Hr) IV Continuous <Continuous>  dextrose 5%. 1000 milliLiter(s) (100 mL/Hr) IV Continuous <Continuous>  dextrose 5%. 1000 milliLiter(s) (50 mL/Hr) IV Continuous <Continuous>  dextrose 50% Injectable 25 Gram(s) IV Push once  dextrose 50% Injectable 12.5 Gram(s) IV Push once  dextrose 50% Injectable 25 Gram(s) IV Push once  dextrose 50% Injectable 25 Gram(s) IV Push once  dextrose 50% Injectable 12.5 Gram(s) IV Push once  dextrose 50% Injectable 25 Gram(s) IV Push once  epoetin deidre-epbx (RETACRIT) Injectable 38448 Unit(s) IV Push <User Schedule>  famotidine    Tablet 20 milliGRAM(s) Oral every 12 hours  glucagon  Injectable 1 milliGRAM(s) IntraMuscular once  glucagon  Injectable 1 milliGRAM(s) IntraMuscular once  heparin   Injectable 5000 Unit(s) SubCutaneous every 8 hours  insulin glargine Injectable (LANTUS) 5 Unit(s) SubCutaneous at bedtime  insulin lispro (ADMELOG) corrective regimen sliding scale   SubCutaneous three times a day before meals  midodrine. 10 milliGRAM(s) Oral three times a day  multivitamin 1 Tablet(s) Oral daily  piperacillin/tazobactam IVPB.. 3.375 Gram(s) IV Intermittent every 12 hours  vancomycin  IVPB 500 milliGRAM(s) IV Intermittent once    MEDICATIONS  (PRN):  aluminum hydroxide/magnesium hydroxide/simethicone Suspension 30 milliLiter(s) Oral four times a day PRN Indigestion  dextrose Oral Gel 15 Gram(s) Oral once PRN Blood Glucose LESS THAN 70 milliGRAM(s)/deciliter  dextrose Oral Gel 15 Gram(s) Oral once PRN Blood Glucose LESS THAN 70 milliGRAM(s)/deciliter  magnesium hydroxide Suspension 30 milliLiter(s) Oral daily PRN Constipation  oxyCODONE    IR 10 milliGRAM(s) Oral every 4 hours PRN Severe Pain (7 - 10)    Allergies & Intolerances:     Review of Systems:  Constitutional: No fever, chills  Eyes: No blurry vision, flashes, floaters, FBS, erythema, discharge, double vision, OU  Neuro: No tremors  Cardiovascular: No chest pain, palpitations  Respiratory: No SOB, no cough  GI: No nausea, vomiting, abdominal pain  : No dysuria  Skin: no rash  Psych: no depression  Endocrine: no polyuria, polydipsia  Heme/lymph: no swelling    VITALS: T(C): 36.8 (01-11-23 @ 18:08)  T(F): 98.3 (01-11-23 @ 18:08), Max: 99.8 (01-11-23 @ 04:30)  HR: 91 (01-11-23 @ 18:08) (71 - 92)  BP: 106/46 (01-11-23 @ 18:08) (90/43 - 113/57)  RR:  (17 - 23)  SpO2:  (98% - 100%)  Wt(kg): --  General: AAO x 3, appropriate mood and affect    Ophthalmology Exam:  Visual acuity (cc): 20/70 OD PHNI; NLP OS  Pupils: non reactive ?surgical pupils  Ttono: 11 OD 26 OS  Extraocular movements (EOMs): Full OU, no pain, no diplopia  Confrontational Visual Field (CVF): Full OD    Pen Light Exam (PLE)  External: Flat OU  Lids/Lashes/Lacrimal Ducts: Flat OU    Sclera/Conjunctiva: W+Q OU  Cornea: Cl OU  Anterior Chamber: D+F OU    Iris: Flat OU  Lens: PCIOL OU    Fundus Exam: dilated with 1% tropicamide and 2.5% phenylephrine  Approval obtained from primary team for dilation  Patient aware that pupils can remained dilated for at least 4-6 hours  Exam performed with 20D lens    Vitreous: wnl OU  Disc, cup/disc: 0.3 OD; pale OS> OD;   Macula: traction vs regressed nve sup and inf to disc OD; OS: pale  Vessels: wnl OD; not well visualized OS  Periphery: wnl OU     not applicable

## 2024-05-02 NOTE — H&P PST ADULT - ASSESSMENT
DASI Score:  DASI Activity: able to go up one flight of stairs or walk 1-2 blocks with out difficulty  Loose or removable teeth: denies  DASI Score:7.99  DASI Activity: pt walks 2 miles daily, drives, performs all ADL's without assistance able to go up one flight of stairs or walk 1-2 blocks with out difficulty  Loose or removable teeth: denies   CAPRINI SCORE [CLOT]    AGE RELATED RISK FACTORS                                                       MOBILITY RELATED FACTORS  [ ] Age 41-60 years                                            (1 Point)                  [ ] Bed rest                                                        (1 Point)  [ ] Age: 61-74 years                                           (2 Points)                 [ ] Plaster cast                                                   (2 Points)  [x ] Age= 75 years                                              (3 Points)                 [ ] Bed bound for more than 72 hours                 (2 Points)    DISEASE RELATED RISK FACTORS                                               GENDER SPECIFIC FACTORS  [ ] Edema in the lower extremities                       (1 Point)                  [ ] Pregnancy                                                     (1 Point)  [ x] Varicose veins                                               (1 Point)                  [ ] Post-partum < 6 weeks                                   (1 Point)             [x ] BMI > 25 Kg/m2                                            (1 Point)                  [ ] Hormonal therapy  or oral contraception          (1 Point)                 [ ] Sepsis (in the previous month)                        (1 Point)                  [ ] History of pregnancy complications                 (1 point)  [ ] Pneumonia or serious lung disease                                               [ ] Unexplained or recurrent                     (1 Point)           (in the previous month)                               (1 Point)  [ ] Abnormal pulmonary function test                     (1 Point)                 SURGERY RELATED RISK FACTORS  [ ] Acute myocardial infarction                              (1 Point)                 [ ]  Section                                             (1 Point)  [ ] Congestive heart failure (in the previous month)  (1 Point)               [ ] Minor surgery                                                  (1 Point)   [ ] Inflammatory bowel disease                             (1 Point)                 [ ] Arthroscopic surgery                                        (2 Points)  [ ] Central venous access                                      (2 Points)                [x ] General surgery lasting more than 45 minutes   (2 Points)       [ ] Stroke (in the previous month)                          (5 Points)               [ ] Elective arthroplasty                                         (5 Points)                                                                                                                                               HEMATOLOGY RELATED FACTORS                                                 TRAUMA RELATED RISK FACTORS  [ ] Prior episodes of VTE                                     (3 Points)                [ ] Fracture of the hip, pelvis, or leg                       (5 Points)  [ ] Positive family history for VTE                         (3 Points)                 [ ] Acute spinal cord injury (in the previous month)  (5 Points)  [ ] Prothrombin 06325 A                                     (3 Points)                 [ ] Paralysis  (less than 1 month)                             (5 Points)  [ ] Factor V Leiden                                             (3 Points)                  [ ] Multiple Trauma within 1 month                        (5 Points)  [ ] Lupus anticoagulants                                     (3 Points)                                                           [ ] Anticardiolipin antibodies                               (3 Points)                                                       [ ] High homocysteine in the blood                      (3 Points)                                             [ ] Other congenital or acquired thrombophilia      (3 Points)                                                [ ] Heparin induced thrombocytopenia                  (3 Points)                                          Total Score [ 6 ]    Caprini Score 0 - 2:  Low Risk, No VTE Prophylaxis required for most patients, encourage ambulation  Caprini Score 3 - 6:  At Risk, pharmacologic VTE prophylaxis is indicated for most patients (in the absence of a contraindication)  Caprini Score Greater than or = 7:  High Risk, pharmacologic VTE prophylaxis is indicated for most patients (in the absence of a contraindication)

## 2024-05-02 NOTE — H&P PST ADULT - NSANTHOSAYNRD_GEN_A_CORE
No. CATHLEEN screening performed.  STOP BANG Legend: 0-2 = LOW Risk; 3-4 = INTERMEDIATE Risk; 5-8 = HIGH Risk

## 2024-05-02 NOTE — H&P PST ADULT - HISTORY OF PRESENT ILLNESS
Patient is a 78F with Stage III prolapse managed with a pessary here for follow up. She continues to do CIC 4-5x per day. She is currently not sexually active and is interested in surgical management of her prolapse.     79yo with known history of pelvic organ prolapse and rectocele supported with  LS #3 1/2 along with incomplete emptying for which she performs CIC 4-5 times per day, who presents today for follow up. She was last seen on 1/24, at which time pessary was removed secondary to interest in discussing surgical options and plan for repeat POP-Q evaluation. She does report incontinence episodes, unable to associate it with YANI or UUI. She reports she is no longer sexually active and does not desire to be in the future.  ?  UDS (3/2024): halfway 500 mL, +-500 mL, no DO,  cc.  Pelvic US (2/2024): 6.7 x 3.7 x 2.9 cm uterus, endometrium 3 mm; left ovary 2.9 x 1.5 x 1.5 cm with a simple cyst 1.5 x 1.2 x 1.3 cm.; right ovary wnl  ? 78 year old female with PMHx of Hypothyroidism, Midline cystocele, cholecystectomy, varicose veins stripping. PSHx LTKR, Cholecystectomy C/o pelvic organ prolapse for the past 4 years that has progressively worsen despite conservative measures, pessary placement. Pt also reports rectocele and incomplete emptying for which she performs CIC 4-5 times per day. She does report incontinence episodes, hematuria or dysuria. Denies any chest pain, palpitations, SOB, N/V, fever or chills. She now presents to PST prior to scheduled  Anterior, Posterior and Enterocele Colporrhaphy Repair, Partial Vaginectomy, Cystoscopy possible Sling with Dr. Bailey on 5/23/24.

## 2024-05-07 PROBLEM — N81.11 MIDLINE CYSTOCELE: Status: ACTIVE | Noted: 2024-03-20

## 2024-05-07 PROBLEM — N81.2 INCOMPLETE UTEROVAGINAL PROLAPSE: Status: ACTIVE | Noted: 2024-03-20

## 2024-05-07 PROBLEM — R33.9 INCOMPLETE EMPTYING OF BLADDER: Status: ACTIVE | Noted: 2018-03-14

## 2024-05-07 PROBLEM — N81.6 RECTOCELE: Status: ACTIVE | Noted: 2018-03-14

## 2024-05-07 PROBLEM — N39.3 STRESS INCONTINENCE, FEMALE: Status: ACTIVE | Noted: 2024-03-18

## 2024-05-08 ENCOUNTER — APPOINTMENT (OUTPATIENT)
Dept: UROGYNECOLOGY | Facility: CLINIC | Age: 79
End: 2024-05-08
Payer: MEDICARE

## 2024-05-08 VITALS
HEIGHT: 68 IN | SYSTOLIC BLOOD PRESSURE: 134 MMHG | DIASTOLIC BLOOD PRESSURE: 78 MMHG | BODY MASS INDEX: 30.92 KG/M2 | WEIGHT: 204 LBS | HEART RATE: 67 BPM

## 2024-05-08 DIAGNOSIS — N81.11 CYSTOCELE, MIDLINE: ICD-10-CM

## 2024-05-08 DIAGNOSIS — N81.2 INCOMPLETE UTEROVAGINAL PROLAPSE: ICD-10-CM

## 2024-05-08 DIAGNOSIS — N39.3 STRESS INCONTINENCE (FEMALE) (MALE): ICD-10-CM

## 2024-05-08 DIAGNOSIS — N81.6 RECTOCELE: ICD-10-CM

## 2024-05-08 DIAGNOSIS — R33.9 RETENTION OF URINE, UNSPECIFIED: ICD-10-CM

## 2024-05-08 PROCEDURE — 99214 OFFICE O/P EST MOD 30 MIN: CPT | Mod: 25

## 2024-05-08 PROCEDURE — 51701 INSERT BLADDER CATHETER: CPT

## 2024-05-08 PROCEDURE — 99459 PELVIC EXAMINATION: CPT

## 2024-05-10 NOTE — PROCEDURE
[Good Fit] : fits well [Pessary Out] : removed [Refit] : refit is not needed [Erosion] : no evidence of erosion [Erythema] : no erythema [Discharge] : no vaginal discharge [FreeTextEntry1] :  #3 1/2 [FreeTextEntry8] : Removed for surgery

## 2024-05-10 NOTE — HISTORY OF PRESENT ILLNESS
19 y.o. female  at 35w3d  Reports + FM, denies VB, LOF or regular CTX  Doing well without concerns   TWG: 10 lbs   GBS collected today   Trich MAURO today  GC/CT collected today  Rpt C/S scheduled 22  The skin of the suprapubic region was evaluated and appears intact.  Counseled the patient to shower daily and to wash this area with an antibacterial soap such as Dial daily.  Advised her to not shave the hair from this area from now until after delivery.  I also counseled the patient to place antibacterial hand soap in all her bathrooms and kitchen at home to help facilitate proper hand hygiene practices before and after delivery.   Reviewed warning signs, normal FKCs, labor precautions and how/when to call. Patient states understanding.  RTC x 1 wk, call or present sooner prn.     I spent a total of 20 minutes on the day of the visit.This includes face to face time and non-face to face time preparing to see the patient (eg, review of tests), Obtaining and/or reviewing separately obtained history, Documenting clinical information in the electronic or other health record, Independently interpreting resultsand communicating results to the patient/family/caregiver, or Care coordination.          
[FreeTextEntry1] : Batool is a 77yo with Stage III prolapse (managed with a GH LS #3 1/2) along with incomplete emptying presents today for follow up. She continues to do CIC 4-5x per day. She is currently not sexually active and is not interested in being so in the future. She is interested in surgical management of her prolapse.  Pessary was removed today and will remain out.   UDS (3/2024): shelter 500 mL, +-500 mL, no DO,  cc Pelvic US (2/2024): 6.7 x 3.7 x 2.9 cm uterus, endometrium 3 mm; left ovary 2.9 x 1.5 x 1.5 cm with a simple cyst 1.5 x 1.2 x 1.3 cm.; right ovary wnl Pap: collected today  []Urine culture: sent today    PMH: HLD, hypothyroid; denies any bleeding disorder or blood clots  PSH: knee surgery, gallbladder, varicose veins

## 2024-05-10 NOTE — DISCUSSION/SUMMARY
[FreeTextEntry1] : Batool is a 79yo with Stage III prolapse (managed with a GH LS #3 1/2) along with incomplete emptying presents today for follow up. We reviewed both nonsurgical and surgical options and she continues to desire surgical management. She has been counseled regarding options for reconstructive surgery. This includes both abdominal, laparoscopic, robotic, and vaginal options. She has elected for the vaginal approach to surgery. She is no longer sexually active and does not desire to be in the future. We discussed concomitant hysterectomy at time of surgery or leaving uterus in situ and closing the vagina. She understands that with the uterus remaining in situ, it will be unable to be accessed vaginally in the future. Based on our discussion she will have a partial vaginectomy, vaginal closure, APE repair.   For her stress incontinence, we discussed given his history of incomplete emptying and risks of retention, we will not perform any anti-incontinence procedure at time of prolapse repair. If this becomes bothersome in the future, this could be performed as a staged procedure.   We discussed success rates, failure rates, and possible risks. The risks discussed include, but are not limited to: changes to the vaginal anatomy, vaginal pain, bleeding, pelvic pain, need for revision, vaginal discharge, urinary retention, development or worsening of stress urinary incontinence or urge incontinence, infection, failure of the procedure, neuropathy. We also extensively reviewed the risk of injury to the bowel, rectum, bladder, ureters, urethra, blood vessels, and nerves. We discussed the risk of fistula formation and possible conversion to laparotomy or laparoscopy. We also discussed possible change in bowel habits, with improvement or worsening of constipation. All risks were explained in layman's terms.   Based on our discussion she would like to proceed with partial vaginectomy, vaginal closure, anterior posterior enterocele repair, cystoscopy. We reviewed the preoperative and postoperative instructions as well as hospital stay. Consent was signed in the office. She is aware of learners participating in her care.

## 2024-05-10 NOTE — PHYSICAL EXAM
[Chaperone Present] : A chaperone was present in the examining room during all aspects of the physical examination [46205] : A chaperone was present during the pelvic exam. [Labia Majora] : were normal [Labia Minora] : were normal [Normal] : was normal [Normal Appearance] : general appearance was normal [Aa ____] : Aa [unfilled] [Ba ____] : Ba [unfilled] [C ____] : C [unfilled] [GH ____] : GH [unfilled] [PB ____] : PB [unfilled] [TVL ____] : TVL  [unfilled] [Ap ____] : Ap [unfilled] [Bp ____] : Bp [unfilled] [D ____] : D [unfilled] [FreeTextEntry1] : General: Well, appearing. Alert and orientated. No acute distress HEENT: Normocephalic, atraumatic and extraocular muscles appear to be intact  Neck: Full range of motion, no obvious lymphadenopathy, deformities, or masses noted  Respiratory: Speaking in full sentences comfortably, normal work of breathing and no cough during visit Musculoskeletal: active full range of motion in extremities  Extremities: No upper extremity edema noted Skin: no obvious rash or skin lesions Neuro: Orientated X 3, speech is fluent, normal rate Psych: Normal mood and affect

## 2024-05-22 ENCOUNTER — NON-APPOINTMENT (OUTPATIENT)
Age: 79
End: 2024-05-22

## 2024-05-22 ENCOUNTER — TRANSCRIPTION ENCOUNTER (OUTPATIENT)
Age: 79
End: 2024-05-22

## 2024-05-23 ENCOUNTER — APPOINTMENT (OUTPATIENT)
Dept: UROGYNECOLOGY | Facility: HOSPITAL | Age: 79
End: 2024-05-23
Payer: MEDICARE

## 2024-05-23 ENCOUNTER — INPATIENT (INPATIENT)
Facility: HOSPITAL | Age: 79
LOS: 0 days | Discharge: HOME | DRG: 761 | End: 2024-05-24
Attending: STUDENT IN AN ORGANIZED HEALTH CARE EDUCATION/TRAINING PROGRAM | Admitting: STUDENT IN AN ORGANIZED HEALTH CARE EDUCATION/TRAINING PROGRAM
Payer: MEDICARE

## 2024-05-23 VITALS
HEIGHT: 68 IN | HEART RATE: 77 BPM | RESPIRATION RATE: 17 BRPM | SYSTOLIC BLOOD PRESSURE: 138 MMHG | WEIGHT: 205.91 LBS | OXYGEN SATURATION: 96 % | DIASTOLIC BLOOD PRESSURE: 85 MMHG | TEMPERATURE: 98 F

## 2024-05-23 DIAGNOSIS — N81.11 CYSTOCELE, MIDLINE: ICD-10-CM

## 2024-05-23 DIAGNOSIS — Z90.49 ACQUIRED ABSENCE OF OTHER SPECIFIED PARTS OF DIGESTIVE TRACT: Chronic | ICD-10-CM

## 2024-05-23 DIAGNOSIS — Z96.651 PRESENCE OF RIGHT ARTIFICIAL KNEE JOINT: Chronic | ICD-10-CM

## 2024-05-23 PROCEDURE — 57106 VAGNC PRTL RMVL VAG WALL: CPT | Mod: 59

## 2024-05-23 PROCEDURE — 57265 CMBN AP COLPRHY W/NTRCL RPR: CPT

## 2024-05-23 DEVICE — SURGIFLO MATRIX WITH THROMBIN KIT: Type: IMPLANTABLE DEVICE | Status: FUNCTIONAL

## 2024-05-23 RX ORDER — KETOROLAC TROMETHAMINE 30 MG/ML
15 SYRINGE (ML) INJECTION EVERY 8 HOURS
Refills: 0 | Status: DISCONTINUED | OUTPATIENT
Start: 2024-05-23 | End: 2024-05-24

## 2024-05-23 RX ORDER — SIMETHICONE 80 MG/1
80 TABLET, CHEWABLE ORAL EVERY 6 HOURS
Refills: 0 | Status: DISCONTINUED | OUTPATIENT
Start: 2024-05-23 | End: 2024-05-24

## 2024-05-23 RX ORDER — SODIUM CHLORIDE 9 MG/ML
1000 INJECTION, SOLUTION INTRAVENOUS
Refills: 0 | Status: DISCONTINUED | OUTPATIENT
Start: 2024-05-23 | End: 2024-05-23

## 2024-05-23 RX ORDER — POLYETHYLENE GLYCOL 3350 17 G/17G
17 POWDER, FOR SOLUTION ORAL AT BEDTIME
Refills: 0 | Status: DISCONTINUED | OUTPATIENT
Start: 2024-05-23 | End: 2024-05-24

## 2024-05-23 RX ORDER — ONDANSETRON 8 MG/1
4 TABLET, FILM COATED ORAL EVERY 8 HOURS
Refills: 0 | Status: COMPLETED | OUTPATIENT
Start: 2024-05-23 | End: 2024-05-24

## 2024-05-23 RX ORDER — LIDOCAINE HCL 20 MG/ML
0.2 VIAL (ML) INJECTION ONCE
Refills: 0 | Status: DISCONTINUED | OUTPATIENT
Start: 2024-05-23 | End: 2024-05-23

## 2024-05-23 RX ORDER — ONDANSETRON 8 MG/1
4 TABLET, FILM COATED ORAL ONCE
Refills: 0 | Status: DISCONTINUED | OUTPATIENT
Start: 2024-05-23 | End: 2024-05-23

## 2024-05-23 RX ORDER — ACETAMINOPHEN 500 MG
1000 TABLET ORAL EVERY 6 HOURS
Refills: 0 | Status: DISCONTINUED | OUTPATIENT
Start: 2024-05-23 | End: 2024-05-24

## 2024-05-23 RX ORDER — OXYCODONE HYDROCHLORIDE 5 MG/1
5 TABLET ORAL EVERY 4 HOURS
Refills: 0 | Status: DISCONTINUED | OUTPATIENT
Start: 2024-05-23 | End: 2024-05-24

## 2024-05-23 RX ORDER — LEVOTHYROXINE SODIUM 125 MCG
1 TABLET ORAL
Qty: 0 | Refills: 0 | DISCHARGE

## 2024-05-23 RX ORDER — DIAZEPAM 5 MG
1 TABLET ORAL
Qty: 10 | Refills: 0
Start: 2024-05-23

## 2024-05-23 RX ORDER — SODIUM CHLORIDE 9 MG/ML
3 INJECTION INTRAMUSCULAR; INTRAVENOUS; SUBCUTANEOUS EVERY 8 HOURS
Refills: 0 | Status: DISCONTINUED | OUTPATIENT
Start: 2024-05-23 | End: 2024-05-23

## 2024-05-23 RX ORDER — SENNA PLUS 8.6 MG/1
2 TABLET ORAL DAILY
Refills: 0 | Status: DISCONTINUED | OUTPATIENT
Start: 2024-05-23 | End: 2024-05-24

## 2024-05-23 RX ORDER — HYDROMORPHONE HYDROCHLORIDE 2 MG/ML
0.2 INJECTION INTRAMUSCULAR; INTRAVENOUS; SUBCUTANEOUS
Refills: 0 | Status: DISCONTINUED | OUTPATIENT
Start: 2024-05-23 | End: 2024-05-23

## 2024-05-23 RX ADMIN — Medication 15 MILLIGRAM(S): at 15:00

## 2024-05-23 RX ADMIN — HYDROMORPHONE HYDROCHLORIDE 0.2 MILLIGRAM(S): 2 INJECTION INTRAMUSCULAR; INTRAVENOUS; SUBCUTANEOUS at 13:17

## 2024-05-23 RX ADMIN — HYDROMORPHONE HYDROCHLORIDE 0.2 MILLIGRAM(S): 2 INJECTION INTRAMUSCULAR; INTRAVENOUS; SUBCUTANEOUS at 13:30

## 2024-05-23 RX ADMIN — POLYETHYLENE GLYCOL 3350 17 GRAM(S): 17 POWDER, FOR SOLUTION ORAL at 22:22

## 2024-05-23 RX ADMIN — Medication 1 TABLET(S): at 17:39

## 2024-05-23 RX ADMIN — Medication 15 MILLIGRAM(S): at 22:51

## 2024-05-23 RX ADMIN — Medication 15 MILLIGRAM(S): at 22:21

## 2024-05-23 RX ADMIN — ONDANSETRON 4 MILLIGRAM(S): 8 TABLET, FILM COATED ORAL at 22:21

## 2024-05-23 RX ADMIN — Medication 15 MILLIGRAM(S): at 14:48

## 2024-05-23 RX ADMIN — SODIUM CHLORIDE 75 MILLILITER(S): 9 INJECTION, SOLUTION INTRAVENOUS at 11:52

## 2024-05-23 NOTE — BRIEF OPERATIVE NOTE - NSICDXBRIEFPROCEDURE_GEN_ALL_CORE_FT
PROCEDURES:  Partial vaginectomy 23-May-2024 12:17:41  Amanda Wright  [place] colpocleisis 23-May-2024 12:18:03  Amanda Wright  Anterior colporrhaphy with repair of enterocele 23-May-2024 12:18:25  Amanda Wright  Posterior colporrhaphy 23-May-2024 12:18:42  Amanda Wright  Rigid cystoscopy 23-May-2024 12:18:50  Amanda Wright

## 2024-05-23 NOTE — BRIEF OPERATIVE NOTE - NSICDXBRIEFPOSTOP_GEN_ALL_CORE_FT
POST-OP DIAGNOSIS:  Complete uterovaginal prolapse 23-May-2024 12:19:43  Amanda Wright  Midline cystocele 23-May-2024 12:19:38  Amanda Wright  Rectocele 23-May-2024 12:19:32  Amanda Wright

## 2024-05-23 NOTE — ASU DISCHARGE PLAN (ADULT/PEDIATRIC) - ASU DC SPECIAL INSTRUCTIONSFT
Return to your regular way of eating.  Resume normal activity as tolerated, but no heavy lifting or strenuous activity for 6 weeks.  No driving for next 2 weeks and/or while on narcotic pain medication.  Complete vaginal rest, no tampons, no douching, no tub bathing, no sexual activities for 6 weeks unless otherwise instructed by your doctor.  Call your doctor with any signs and symptoms of infection such as fever, chills, nausea or vomiting.  Call your doctor with redness or swelling at the incision site, fluid leakage or wound separation.  Call your doctor if you're unable to tolerate food or have difficulty urinating.  Call your doctor if you have pain that is not relieved by your prescribed medications.  Notify your doctor with any other concerns.  Follow up with Dr. Bailey in two weeks

## 2024-05-23 NOTE — ASU DISCHARGE PLAN (ADULT/PEDIATRIC) - CARE PROVIDER_API CALL
Ana Cristina Bailey  Urogyn and Reconst Pelvic Surg  [address]  [place] [address]  Phone: [phone]  Fax: [phone]  Follow Up Time: 2 weeks

## 2024-05-23 NOTE — PRE-OP CHECKLIST - VERIFY SURGICAL SITE/SIDE WITH PATIENT
BIBA for seizure in the chair at [facility], BS at facility [facility]
done

## 2024-05-23 NOTE — BRIEF OPERATIVE NOTE - OPERATION/FINDINGS
Complete uterovaginal prolapse with cystocele, rectocele and enterocele. On cystoscopy, normal bladder mucosa and no suture, mesh, injury,  or foreign body noted.  Bilateral ureteral orifices were identified and effluxing clear yellow urine.

## 2024-05-23 NOTE — BRIEF OPERATIVE NOTE - NSICDXBRIEFPREOP_GEN_ALL_CORE_FT
PRE-OP DIAGNOSIS:  Rectocele 23-May-2024 12:19:18  Amanda Wright  Midline cystocele 23-May-2024 12:19:14  Amanda Wright  Complete uterovaginal prolapse 23-May-2024 12:19:08  Amanda Wright

## 2024-05-24 ENCOUNTER — TRANSCRIPTION ENCOUNTER (OUTPATIENT)
Age: 79
End: 2024-05-24

## 2024-05-24 VITALS
RESPIRATION RATE: 18 BRPM | OXYGEN SATURATION: 94 % | DIASTOLIC BLOOD PRESSURE: 76 MMHG | HEART RATE: 60 BPM | TEMPERATURE: 98 F | SYSTOLIC BLOOD PRESSURE: 116 MMHG

## 2024-05-24 LAB
ANION GAP SERPL CALC-SCNC: 12 MMOL/L — SIGNIFICANT CHANGE UP (ref 5–17)
BUN SERPL-MCNC: 18 MG/DL — SIGNIFICANT CHANGE UP (ref 7–23)
CALCIUM SERPL-MCNC: 8.4 MG/DL — SIGNIFICANT CHANGE UP (ref 8.4–10.5)
CHLORIDE SERPL-SCNC: 101 MMOL/L — SIGNIFICANT CHANGE UP (ref 96–108)
CO2 SERPL-SCNC: 23 MMOL/L — SIGNIFICANT CHANGE UP (ref 22–31)
CREAT SERPL-MCNC: 0.9 MG/DL — SIGNIFICANT CHANGE UP (ref 0.5–1.3)
EGFR: 66 ML/MIN/1.73M2 — SIGNIFICANT CHANGE UP
GLUCOSE SERPL-MCNC: 98 MG/DL — SIGNIFICANT CHANGE UP (ref 70–99)
HCT VFR BLD CALC: 32.1 % — LOW (ref 34.5–45)
HGB BLD-MCNC: 10.5 G/DL — LOW (ref 11.5–15.5)
MCHC RBC-ENTMCNC: 29.1 PG — SIGNIFICANT CHANGE UP (ref 27–34)
MCHC RBC-ENTMCNC: 32.7 GM/DL — SIGNIFICANT CHANGE UP (ref 32–36)
MCV RBC AUTO: 88.9 FL — SIGNIFICANT CHANGE UP (ref 80–100)
NRBC # BLD: 0 /100 WBCS — SIGNIFICANT CHANGE UP (ref 0–0)
PLATELET # BLD AUTO: 206 K/UL — SIGNIFICANT CHANGE UP (ref 150–400)
POTASSIUM SERPL-MCNC: 3.9 MMOL/L — SIGNIFICANT CHANGE UP (ref 3.5–5.3)
POTASSIUM SERPL-SCNC: 3.9 MMOL/L — SIGNIFICANT CHANGE UP (ref 3.5–5.3)
RBC # BLD: 3.61 M/UL — LOW (ref 3.8–5.2)
RBC # FLD: 13.7 % — SIGNIFICANT CHANGE UP (ref 10.3–14.5)
SODIUM SERPL-SCNC: 136 MMOL/L — SIGNIFICANT CHANGE UP (ref 135–145)
WBC # BLD: 6.95 K/UL — SIGNIFICANT CHANGE UP (ref 3.8–10.5)
WBC # FLD AUTO: 6.95 K/UL — SIGNIFICANT CHANGE UP (ref 3.8–10.5)

## 2024-05-24 PROCEDURE — C1889: CPT

## 2024-05-24 PROCEDURE — 86850 RBC ANTIBODY SCREEN: CPT

## 2024-05-24 PROCEDURE — 80048 BASIC METABOLIC PNL TOTAL CA: CPT

## 2024-05-24 PROCEDURE — 86900 BLOOD TYPING SEROLOGIC ABO: CPT

## 2024-05-24 PROCEDURE — C9399: CPT

## 2024-05-24 PROCEDURE — 86901 BLOOD TYPING SEROLOGIC RH(D): CPT

## 2024-05-24 PROCEDURE — 85027 COMPLETE CBC AUTOMATED: CPT

## 2024-05-24 RX ORDER — IBUPROFEN 200 MG
1 TABLET ORAL
Qty: 0 | Refills: 0 | DISCHARGE

## 2024-05-24 RX ORDER — LEVOTHYROXINE SODIUM 125 MCG
125 TABLET ORAL DAILY
Refills: 0 | Status: DISCONTINUED | OUTPATIENT
Start: 2024-05-24 | End: 2024-05-24

## 2024-05-24 RX ORDER — ACETAMINOPHEN 500 MG
2 TABLET ORAL
Qty: 0 | Refills: 0 | DISCHARGE

## 2024-05-24 RX ADMIN — Medication 15 MILLIGRAM(S): at 06:16

## 2024-05-24 RX ADMIN — Medication 1000 MILLIGRAM(S): at 06:13

## 2024-05-24 RX ADMIN — Medication 125 MICROGRAM(S): at 12:18

## 2024-05-24 RX ADMIN — ONDANSETRON 4 MILLIGRAM(S): 8 TABLET, FILM COATED ORAL at 05:44

## 2024-05-24 RX ADMIN — SENNA PLUS 2 TABLET(S): 8.6 TABLET ORAL at 12:18

## 2024-05-24 RX ADMIN — Medication 15 MILLIGRAM(S): at 05:44

## 2024-05-24 RX ADMIN — Medication 1000 MILLIGRAM(S): at 05:43

## 2024-05-24 RX ADMIN — Medication 1 TABLET(S): at 05:44

## 2024-05-24 RX ADMIN — Medication 1000 MILLIGRAM(S): at 12:18

## 2024-05-24 NOTE — DISCHARGE NOTE PROVIDER - NSDCFUSCHEDAPPT_GEN_ALL_CORE_FT
[facility] Physician [facility]  UROGYN [address]  Scheduled Appointment: 06/04/2024

## 2024-05-24 NOTE — DISCHARGE NOTE PROVIDER - NSDCCPTREATMENT_GEN_ALL_CORE_FT
PRINCIPAL PROCEDURE  Procedure: Repair of cystocele and enterocele  Findings and Treatment:       SECONDARY PROCEDURE  Procedure: Anterior colporrhaphy with repair of enterocele  Findings and Treatment:     Procedure: [place] colpocleisis  Findings and Treatment:     Procedure: Partial vaginectomy  Findings and Treatment:

## 2024-05-24 NOTE — DISCHARGE NOTE PROVIDER - NSDCFUADDINST_GEN_ALL_CORE_FT
Postoperative Instructions  Bowel regimen To avoid constipation and straining, we suggest the following (simultaneously): 1. Colace (stool softener) 100mg, one pill three times a day (breakfast, lunch, dinner). If stool is too soft, decrease to twice a day (breakfast, dinner).  2. If still constipated, you can add: Miralax once at bedtime.   All of these agents can be obtained over the counter at the pharmacy.  Pain control For pain control, take the followin. Motrin 600mg every 6 hours , take with food 2. Add Tylenol as needed if still have pain despite Motrin   Motrin and Tylenol can be obtained over the counter.  Postoperative restrictions Nothing in the vagina (tampons, sexual intercourse), No tub baths, pools or hot tubs for 6 weeks (showers are ok!). No lifting anything heavier than 10 lbs, no strenuous exercise for 2 weeks after surgery. Do not pull or cut any stitches that you see around your incision.   Vaginal bleeding Spotting and intermittent passage of blood clots per vagina is normal in first few weeks after surgery. If you are soaking 1 pad per hour, that is not normal and you should notify my office and seek medical attention right away.  Vaginal discharge Vaginal discharge (all colors) is normal after vaginal surgery. If you’ve had vaginal surgery, you have sutures in your vagina which take 3 months to fully absorb. You may have vaginal discharge during this time. This is normal.  Postoperative Instructions  Bowel regimen To avoid constipation and straining, we suggest the following (simultaneously): 1. Colace (stool softener) 100mg, one pill three times a day (breakfast, lunch, dinner). If stool is too soft, decrease to twice a day (breakfast, dinner).  2. If still constipated, you can add: Miralax once at bedtime.   All of these agents can be obtained over the counter at the pharmacy.  Pain control For pain control, take the followin. Motrin 600mg every 6 hours , take with food 2. Add Tylenol as needed if still have pain despite Motrin   Motrin and Tylenol can be obtained over the counter.  Postoperative restrictions Nothing in the vagina (tampons, sexual intercourse), No tub baths, pools or hot tubs for 6 weeks (showers are ok!). No lifting anything heavier than 10 lbs, no strenuous exercise for 2 weeks after surgery. Do not pull or cut any stitches that you see around your incision.   Vaginal bleeding Spotting and intermittent passage of blood clots per vagina is normal in first few weeks after surgery. If you are soaking 1 pad per hour, that is not normal and you should notify my office and seek medical attention right away.  Vaginal discharge Vaginal discharge (all colors) is normal after vaginal surgery. If you’ve had vaginal surgery, you have sutures in your vagina which take 3 months to fully absorb. You may have vaginal discharge during this time. This is normal.     Continue course of Bactrim for UTI as sent to pharmacy.

## 2024-05-24 NOTE — CHART NOTE - NSCHARTNOTEFT_GEN_A_CORE
GYN POST-OP CHECK  JOSE [id number]    Allergies: No Known Allergies    S: Pt awake and alert resting comfortably in bedside chair.  Pain controlled. Pt denies N/V, SOB, CP, palpitations. Tolerates clears.  Not OOB yet.    O:   T(C): 36.2 (05-23-24 @ 11:25), Max: 36.2 (05-23-24 @ 11:25)  HR: 67 (05-23-24 @ 13:30) (63 - 79)  BP: 108/64 (05-23-24 @ 13:30) (108/64 - 132/67)  RR: 17 (05-23-24 @ 13:30) (14 - 18)  SpO2: 99% (05-23-24 @ 13:30) (93% - 99%)  I&O's Summary    23 May 2024 07:01  -  23 May 2024 13:39  --------------------------------------------------------  IN: 150 mL / OUT: 30 mL / NET: 120 mL      Gen: NAD. A+Ox3.  CV: S1S2, RRR  Lungs: CTA B/L  Abd: +BS. soft, gently distended and appropriately tender.  : huerta catheter draining  Ext: PAS in place    A/P: 78y Female now POD#0 s/p Partial vaginectomy, [place] colpocleisis, Anterior colporrhaphy with repair of enterocele, Posterior colporrhaphy and Rigid cystoscopy    Neuro: Analgesia PRN.  Card: Monitor VS. CBC in AM.   Pulm: Incentive spirometer use. Continue   GI: Advance to regular diet. Anti-emetics PRN.  : Huerta to gravity. TOV in AM  Electrolytes: LR@75cc/hr. BMP in AM.   DVT ppx w/ PAS while in bed. Early ambulation, initially with assistance then as tolerated.  Discharge from PACU when criteria met.     d/w GYN team.  LCavalieri PAC
TOV Note    Active trial of void performed.   300cc of NS instilled into the bladder by gravity.  Pt ambulated to the bathroom with minimal assistance.  Milton discontinued.  Pt unable to void  Milton catheter to remain out and pt to perform straight catheterization as per her normal routine.      LCavalieri PAC

## 2024-05-24 NOTE — DISCHARGE NOTE NURSING/CASE MANAGEMENT/SOCIAL WORK - NSDPLANG ASIS_GEN_ALL_CORE
No
Constitutional: No weight change, no fever, no chills, no night sweats, no fatigue  ENT/mouth: No hearing changes, no sore throat, no rhinorrhea  Eyes: No eye pain, no eye redness, no eye sewlling, no vision changes  CV: No chest pain, no orthopnea, no dyspnea on extertion, no palpitations  Resp: No shortness of breath, no cough, no wheezing  GI: No nausea, no vomiting, no diarrhea, no constipation  : No dysuria, no urinary frequency or hesitancy, no hematuria, no polyuria  Skin/Ext: No pain, no rashes, no lower extremity edema  Neuro: + weakness, + syncope no numbness, no dizziness, no headache

## 2024-05-24 NOTE — PROGRESS NOTE ADULT - ASSESSMENT
A/P: 78y POD#   s/p              .  Patient is stable and doing well.      Neuro: Pain well controlled. PO pain meds.   CV: Hemodynamically stable, AM labs include  Pulm: Saturating well on room air, encourage oob/amb, encourage use of incentive spirometry  GI: Advance to regular diet vs. Continue regular diet  : UOP adequate, d/c huerta   vs. Voiding spontaneously  Heme: c/w HSQ, ambulation and SCDs for DVT ppx  FEN: LR@125.  replete electrolytes prn   ID: Afebrile  Endo: No active issues   Dispo: Continue routine post-op care A/P: 78y POD#1 s/p APE repair, partial vaginectomy and closure, cysto. Patient is stable and doing well.      Neuro: Pain well controlled. Tylenol, Toradol, Oxy PRN  CV: Hemodynamically stable, AM labs include CBC/BMP  Pulm: Saturating well on room air, encourage oob/amb, encourage use of incentive spirometry  GI: Continue regular diet  - Miralax, Senna, Simethicone, Zofran  : UOP adequate, d/c huerta this AM  - TOV this AM  Heme: c/w ambulation and SCDs for DVT ppx  FEN: LR@75.  replete electrolytes prn   ID: Afebrile  - C/w Bactrim (5/23-) for UTI (outpt UCx >100k Ecoli)  Endo: No active issues   Dispo: Continue routine post-op care    Gian PGY2 A/P: 78y POD#1 s/p APE repair, partial vaginectomy and closure, cysto. Patient is stable and doing well.      Neuro: Pain well controlled. Tylenol, Toradol, Oxy PRN  CV: Hemodynamically stable, AM labs include CBC/BMP  Pulm: Saturating well on room air, encourage oob/amb, encourage use of incentive spirometry  GI: Continue regular diet  - Miralax, Senna, Simethicone, Zofran  : UOP adequate, d/c huerta this AM  - TOV this AM  Heme: c/w ambulation and SCDs for DVT ppx  FEN: LR@75.  replete electrolytes prn   ID: Afebrile  - C/w Bactrim (5/23-) for UTI (outpt UCx >100k Ecoli)  Endo: No active issues   Dispo: Continue routine post-op care    VTimmel PGY2    ------  Urogyn Fellow Addendum    Patient seen and examined.  Denies any pain.  Tolerating PO intake without n/v.  Not yet oob/ambulating.    T(C): 36.4 (05-24-24 @ 05:42), Max: 36.6 (05-23-24 @ 22:08)  HR: 64 (05-24-24 @ 05:42) (60 - 79)  BP: 115/64 (05-24-24 @ 05:42) (101/63 - 138/85)  RR: 18 (05-24-24 @ 05:42) (14 - 18)  SpO2: 94% (05-24-24 @ 05:42) (92% - 100%)    I&O's Summary    23 May 2024 07:01  -  24 May 2024 07:00  --------------------------------------------------------  IN: 540 mL / OUT: 985 mL / NET: -445 mL      Physical Exam  Gen: NAD  HEENT: NCAT, sclera anicteric  Resp: non-labored  Abd: soft, non-distended, non-tender; incisions clean, dry, intact  : huerta draining clear urine; pad with ~20 cc blood    labs pending    POD#1 s/p APE repair, partial vaginectomy and vaginal closure, cysto, doing well.  -continue pain control prn  -regular diet  -dc IV fluids  -oob/ambulation  -dvt prophylaxis  -void trial this am  -rx for bactrim DS BID x3 days sent to pharmacy for outpt culture >100K E.coli  -written instruction sheet reviewed and discussed with patient  -dispo: home    Ynes Jama MD  Urogynecology Fellow, PGY-7 A/P: 78y POD#1 s/p APE repair, partial vaginectomy and closure, cysto. Patient is stable and doing well.      Neuro: Pain well controlled. Tylenol, Toradol, Oxy PRN  CV: Hemodynamically stable, AM labs include CBC/BMP  Pulm: Saturating well on room air, encourage oob/amb, encourage use of incentive spirometry  GI: Continue regular diet  - Miralax, Senna, Simethicone, Zofran  : UOP adequate, d/c huerta this AM  - TOV this AM  Heme: c/w ambulation and SCDs for DVT ppx  FEN: LR@75.  replete electrolytes prn   ID: Afebrile  - C/w Bactrim (5/23-) for UTI (outpt UCx >100k Ecoli)  Endo: No active issues   Dispo: Continue routine post-op care    VTimmel PGY2    ------  Urogyn Fellow Addendum    Patient seen and examined.  Denies any pain.  Tolerating PO intake without n/v.  Not yet oob/ambulating.    T(C): 36.4 (05-24-24 @ 05:42), Max: 36.6 (05-23-24 @ 22:08)  HR: 64 (05-24-24 @ 05:42) (60 - 79)  BP: 115/64 (05-24-24 @ 05:42) (101/63 - 138/85)  RR: 18 (05-24-24 @ 05:42) (14 - 18)  SpO2: 94% (05-24-24 @ 05:42) (92% - 100%)    I&O's Summary    23 May 2024 07:01  -  24 May 2024 07:00  --------------------------------------------------------  IN: 540 mL / OUT: 985 mL / NET: -445 mL      Physical Exam  Gen: NAD  HEENT: NCAT, sclera anicteric  Resp: non-labored  Abd: soft, non-distended, non-tender; incisions clean, dry, intact  : huerta draining clear urine; pad with ~20 cc blood    labs pending    POD#1 s/p APE repair, partial vaginectomy and vaginal closure, cysto, doing well.  -f/u am labs  -continue pain control prn  -regular diet  -dc IV fluids  -oob/ambulation  -dvt prophylaxis  -void trial this am  -rx for bactrim DS BID x3 days sent to pharmacy for outpt culture >100K E.coli  -written instruction sheet reviewed with and given to patient  -dispo: home    Ynes Jama MD  Urogynecology Fellow, PGY-7 A/P: 78y POD#1 s/p APE repair, partial vaginectomy and closure, cysto. Patient is stable and doing well.      Neuro: Pain well controlled. Tylenol, Toradol, Oxy PRN  CV: Hemodynamically stable, AM labs include CBC/BMP  Pulm: Saturating well on room air, encourage oob/amb, encourage use of incentive spirometry  GI: Continue regular diet  - Miralax, Senna, Simethicone, Zofran  : UOP adequate, d/c huerta this AM  - TOV this AM  Heme: c/w ambulation and SCDs for DVT ppx  FEN: LR@75.  replete electrolytes prn   ID: Afebrile  - C/w Bactrim (5/23-) for UTI (outpt UCx >100k Ecoli)  Endo: No active issues   Dispo: Continue routine post-op care    VTimmel PGY2    ------  Urogyn Fellow Addendum    Patient seen and examined.  Denies any pain.  Tolerating PO intake without n/v.  Not yet oob/ambulating.    T(C): 36.4 (05-24-24 @ 05:42), Max: 36.6 (05-23-24 @ 22:08)  HR: 64 (05-24-24 @ 05:42) (60 - 79)  BP: 115/64 (05-24-24 @ 05:42) (101/63 - 138/85)  RR: 18 (05-24-24 @ 05:42) (14 - 18)  SpO2: 94% (05-24-24 @ 05:42) (92% - 100%)    I&O's Summary    23 May 2024 07:01  -  24 May 2024 07:00  --------------------------------------------------------  IN: 540 mL / OUT: 985 mL / NET: -445 mL      Physical Exam  Gen: NAD  HEENT: NCAT, sclera anicteric  Resp: non-labored  Abd: soft, non-distended, non-tender; incisions clean, dry, intact  : huerta draining clear urine; pad with ~20 cc blood    labs pending    POD#1 s/p APE repair, partial vaginectomy and vaginal closure, cysto, doing well.  -f/u am labs  -continue pain control prn  -regular diet  -dc IV fluids  -oob/ambulation  -dvt prophylaxis  -void trial this am  -rx for bactrim DS BID x3 days sent to pharmacy for outpt culture >100K E.coli  -written instruction sheet reviewed with and given to patient  -dispo: home    Ynes Jama MD  Urogynecology Fellow, PGY-7      Agree with fellow note. Nursing notes reviewed-urine output overall adequate, normal for past few hours. Patient looks and feels well. Will proceed with discharge planning. Precautions and instructions reviewed extensively.

## 2024-05-24 NOTE — DISCHARGE NOTE PROVIDER - NSDCMRMEDTOKEN_GEN_ALL_CORE_FT
Bactrim  mg-160 mg oral tablet: 1 tab(s) orally 2 times a day  levothyroxine 125 mcg (0.125 mg) oral tablet: 1 tab(s) orally once a day  Motrin 600 mg oral tablet: 1 tab(s) orally every 6 hours  Tylenol 500 mg oral tablet: 2 tab(s) orally every 6 hours  Valium 5 mg oral tablet: 1 tab(s) orally 2 times a day as needed for Spasms MDD: 10mg

## 2024-05-24 NOTE — DISCHARGE NOTE PROVIDER - CARE PROVIDER_API CALL
Ana Cristina Bailey  Urogyn and Reconst Pelvic Surg  [address]  [place] [address]  Phone: [phone]  Fax: [phone]  Follow Up Time: 2 weeks

## 2024-05-24 NOTE — DISCHARGE NOTE NURSING/CASE MANAGEMENT/SOCIAL WORK - PATIENT PORTAL LINK FT
You can access the FollowMyHealth Patient Portal offered by [facility] by registering at the following website: [email or web address]. By joining [employer]’s FollowMyHealth portal, you will also be able to view your health information using other applications (apps) compatible with our system.

## 2024-05-24 NOTE — PROGRESS NOTE ADULT - SUBJECTIVE AND OBJECTIVE BOX
R1 GYN Progress Note    POD#   HD#    Patient seen and examined at bedside.  No acute events overnight. No acute complaints.  Pain well controlled.  Patient is ambulating and tolerating.....   Has not yet passed flatus vs. Patient is passing flatus.    Patient is voiding spontaneously vs. Huerta is still in place.   Denies CP, SOB, N/V, fevers, and chills.    Vital Signs Last 24 Hours  T(C): 36.5 (05-24-24 @ 02:11), Max: 36.6 (05-23-24 @ 22:08)  HR: 64 (05-24-24 @ 02:11) (60 - 79)  BP: 101/63 (05-24-24 @ 02:11) (101/63 - 138/85)  RR: 18 (05-24-24 @ 02:11) (14 - 18)  SpO2: 95% (05-24-24 @ 02:11) (92% - 100%)    I&O's Summary    23 May 2024 07:01  -  24 May 2024 06:05  --------------------------------------------------------  IN: 420 mL / OUT: 585 mL / NET: -165 mL        Physical Exam:  General: NAD  CV: RR  Lungs: CTA b/l  Abdomen: Soft, mildly tender, mildly distended, tympanic, normoactive bowel sounds  Incision: _ CDI  : no bleeding on pad  Ext: No pain or swelling in lower extremities bilaterally     Labs:      MEDICATIONS  (STANDING):  acetaminophen     Tablet .. 1000 milliGRAM(s) Oral every 6 hours  cefoTEtan  IVPB 2 Gram(s) IV Intermittent once  polyethylene glycol 3350 17 Gram(s) Oral at bedtime  senna 2 Tablet(s) Oral daily  trimethoprim  160 mG/sulfamethoxazole 800 mG 1 Tablet(s) Oral every 12 hours    MEDICATIONS  (PRN):  acetaminophen     Tablet .. 1000 milliGRAM(s) Oral every 6 hours PRN Mild Pain (1 - 3)  oxyCODONE    IR 5 milliGRAM(s) Oral every 4 hours PRN Severe Pain (7 - 10)  simethicone 80 milliGRAM(s) Chew every 6 hours PRN Gas      A/P: 78y POD#   s/p              .  Patient is stable and doing well.      Neuro: Pain well controlled. PO pain meds.   CV: Hemodynamically stable, AM labs include  Pulm: Saturating well on room air, encourage oob/amb, encourage use of incentive spirometry  GI: Advance to regular diet vs. Continue regular diet  : UOP adequate, d/c huerta   vs. Voiding spontaneously  Heme: c/w HSQ, ambulation and SCDs for DVT ppx  FEN: LR@125.  replete electrolytes prn   ID: Afebrile  Endo: No active issues   Dispo: Continue routine post-op care     R2 GYN Progress Note    POD#1   HD#2    Patient seen and examined at bedside.  No acute events overnight. No acute complaints.  Pain well controlled.  Patient is ambulating and tolerating.....   Has not yet passed flatus vs. Patient is passing flatus.    Patient is voiding spontaneously vs. Milton is still in place.   Denies CP, SOB, N/V, fevers, and chills.    Vital Signs Last 24 Hours  T(C): 36.5 (05-24-24 @ 02:11), Max: 36.6 (05-23-24 @ 22:08)  HR: 64 (05-24-24 @ 02:11) (60 - 79)  BP: 101/63 (05-24-24 @ 02:11) (101/63 - 138/85)  RR: 18 (05-24-24 @ 02:11) (14 - 18)  SpO2: 95% (05-24-24 @ 02:11) (92% - 100%)    I&O's Summary    23 May 2024 07:01  -  24 May 2024 06:05  --------------------------------------------------------  IN: 420 mL / OUT: 585 mL / NET: -165 mL        Physical Exam:  General: NAD  CV: RR  Lungs: CTA b/l  Abdomen: Soft, mildly tender, mildly distended, tympanic, normoactive bowel sounds  Incision: _ CDI  : no bleeding on pad  Ext: No pain or swelling in lower extremities bilaterally     Labs:      MEDICATIONS  (STANDING):  acetaminophen     Tablet .. 1000 milliGRAM(s) Oral every 6 hours  cefoTEtan  IVPB 2 Gram(s) IV Intermittent once  polyethylene glycol 3350 17 Gram(s) Oral at bedtime  senna 2 Tablet(s) Oral daily  trimethoprim  160 mG/sulfamethoxazole 800 mG 1 Tablet(s) Oral every 12 hours    MEDICATIONS  (PRN):  acetaminophen     Tablet .. 1000 milliGRAM(s) Oral every 6 hours PRN Mild Pain (1 - 3)  oxyCODONE    IR 5 milliGRAM(s) Oral every 4 hours PRN Severe Pain (7 - 10)  simethicone 80 milliGRAM(s) Chew every 6 hours PRN Gas         R2 GYN Progress Note    POD#1   HD#2    Patient seen and examined at bedside.  No acute events overnight. No acute complaints.  Pain well controlled. Pt unsure if she had vaginal bleeding overnight.  Patient is ambulating and tolerating regular diet.  Patient is passing flatus.    Milton is still in place.   Denies CP, SOB, N/V, fevers, and chills.    Vital Signs Last 24 Hours  T(C): 36.5 (05-24-24 @ 02:11), Max: 36.6 (05-23-24 @ 22:08)  HR: 64 (05-24-24 @ 02:11) (60 - 79)  BP: 101/63 (05-24-24 @ 02:11) (101/63 - 138/85)  RR: 18 (05-24-24 @ 02:11) (14 - 18)  SpO2: 95% (05-24-24 @ 02:11) (92% - 100%)    I&O's Summary    23 May 2024 07:01  -  24 May 2024 06:05  --------------------------------------------------------  IN: 420 mL / OUT: 585 mL / NET: -165 mL        Physical Exam:  General: NAD  CV: RR  Lungs: CTA b/l  Abdomen: Soft, non-tender, non-distended, tympanic, normoactive bowel sounds  : underlying pad 30% saturated (was not changed overnight per pt)  Ext: No pain or swelling in lower extremities bilaterally     Labs:      MEDICATIONS  (STANDING):  acetaminophen     Tablet .. 1000 milliGRAM(s) Oral every 6 hours  cefoTEtan  IVPB 2 Gram(s) IV Intermittent once  polyethylene glycol 3350 17 Gram(s) Oral at bedtime  senna 2 Tablet(s) Oral daily  trimethoprim  160 mG/sulfamethoxazole 800 mG 1 Tablet(s) Oral every 12 hours    MEDICATIONS  (PRN):  acetaminophen     Tablet .. 1000 milliGRAM(s) Oral every 6 hours PRN Mild Pain (1 - 3)  oxyCODONE    IR 5 milliGRAM(s) Oral every 4 hours PRN Severe Pain (7 - 10)  simethicone 80 milliGRAM(s) Chew every 6 hours PRN Gas         R2 GYN Progress Note    POD#1   HD#2    Patient seen and examined at bedside.  No acute events overnight. No acute complaints.  Pain well controlled.  Patient is ambulating and tolerating regular diet.  Patient is passing flatus.    Milton is still in place.   Denies CP, SOB, N/V, fevers, and chills.    Vital Signs Last 24 Hours  T(C): 36.5 (05-24-24 @ 02:11), Max: 36.6 (05-23-24 @ 22:08)  HR: 64 (05-24-24 @ 02:11) (60 - 79)  BP: 101/63 (05-24-24 @ 02:11) (101/63 - 138/85)  RR: 18 (05-24-24 @ 02:11) (14 - 18)  SpO2: 95% (05-24-24 @ 02:11) (92% - 100%)    I&O's Summary    23 May 2024 07:01  -  24 May 2024 06:05  --------------------------------------------------------  IN: 420 mL / OUT: 585 mL / NET: -165 mL        Physical Exam:  General: NAD  CV: RR  Lungs: CTA b/l  Abdomen: Soft, non-tender, non-distended, tympanic, normoactive bowel sounds  : underlying pad 30% saturated (was not changed overnight per pt)  Ext: No pain or swelling in lower extremities bilaterally     Labs:      MEDICATIONS  (STANDING):  acetaminophen     Tablet .. 1000 milliGRAM(s) Oral every 6 hours  cefoTEtan  IVPB 2 Gram(s) IV Intermittent once  polyethylene glycol 3350 17 Gram(s) Oral at bedtime  senna 2 Tablet(s) Oral daily  trimethoprim  160 mG/sulfamethoxazole 800 mG 1 Tablet(s) Oral every 12 hours    MEDICATIONS  (PRN):  acetaminophen     Tablet .. 1000 milliGRAM(s) Oral every 6 hours PRN Mild Pain (1 - 3)  oxyCODONE    IR 5 milliGRAM(s) Oral every 4 hours PRN Severe Pain (7 - 10)  simethicone 80 milliGRAM(s) Chew every 6 hours PRN Gas

## 2024-05-24 NOTE — DISCHARGE NOTE PROVIDER - HOSPITAL COURSE
79yo admitted for scheduled APE repair, partial vaginectomy and closure, cysto. Procedure uncomplicated, please see brief operative note for details. On POD#0 patient's pain was well controlled, urine output was adequate and she tolerated a regular diet.  On POD#1, post operative Hct was appropriate: ****. Patient underwent active trial of void - huerta was removed and patient voided spontaneously.***     On day of discharge on POD#1 patient's pain was well controlled and she was meeting all postoperative milestones. She will have close follow up with Dr. Bailey. 79yo admitted for scheduled APE repair, partial vaginectomy and closure, cysto. Procedure uncomplicated, please see brief operative note for details. On POD#0 patient's pain was well controlled, urine output was adequate and she tolerated a regular diet.  On POD#1, post operative Hct was appropriate: 37.1->32.1. Patient underwent active trial of void and was unable to void, so pt was discharged with plan for intermittent self-catheterization (as she had already been doing pre-operatively).    On day of discharge on POD#1 patient's pain was well controlled and she was meeting all postoperative milestones. She will have close follow up with Dr. Bailey. 77yo admitted for scheduled APE repair, partial vaginectomy and closure, cysto. Procedure uncomplicated, please see brief operative note for details. On POD#0 patient's pain was well controlled, urine output was adequate and she tolerated a regular diet. She was initiated on PO Bactrim due to UTI (outpatient UCx with >100k Ecoli). On POD#1, post operative Hct was appropriate: 37.1->32.1. Patient underwent active trial of void and was unable to void, so pt was discharged with plan for intermittent self-catheterization (as she had already been doing pre-operatively).     On day of discharge on POD#1 patient's pain was well controlled and she was meeting all postoperative milestones. She will have close follow up with Dr. Bailey.

## 2024-05-25 LAB — CYTOLOGY CVX/VAG DOC THIN PREP: NORMAL

## 2024-06-04 ENCOUNTER — APPOINTMENT (OUTPATIENT)
Dept: UROGYNECOLOGY | Facility: CLINIC | Age: 79
End: 2024-06-04
Payer: MEDICARE

## 2024-06-04 VITALS
HEIGHT: 68 IN | WEIGHT: 203 LBS | HEART RATE: 83 BPM | SYSTOLIC BLOOD PRESSURE: 112 MMHG | BODY MASS INDEX: 30.77 KG/M2 | DIASTOLIC BLOOD PRESSURE: 75 MMHG

## 2024-06-04 DIAGNOSIS — B37.89 OTHER SITES OF CANDIDIASIS: ICD-10-CM

## 2024-06-04 PROBLEM — N81.11 CYSTOCELE, MIDLINE: Chronic | Status: ACTIVE | Noted: 2024-05-02

## 2024-06-04 PROBLEM — M19.90 UNSPECIFIED OSTEOARTHRITIS, UNSPECIFIED SITE: Chronic | Status: ACTIVE | Noted: 2024-05-02

## 2024-06-04 PROCEDURE — 99024 POSTOP FOLLOW-UP VISIT: CPT

## 2024-06-04 RX ORDER — NYSTATIN 100000 1/G
100000 POWDER TOPICAL
Qty: 1 | Refills: 1 | Status: ACTIVE | COMMUNITY
Start: 2024-06-04 | End: 1900-01-01

## 2024-06-04 NOTE — OBJECTIVE
[Post Void Residual ____ ml] : Post Void Residual was [unfilled] ml [Soft and Nontender] : soft and nontender [Clean, Dry, Intact] : Clean, Dry, Intact [Good Support] : Good support [Healing well] : healing well [No Masses or Tenderness] : no masses or tenderness [FreeTextEntry3] : erythematous satellite lesions noted in bilateral inguinal folds.

## 2024-06-04 NOTE — SUBJECTIVE
[FreeTextEntry1] : Feels generally well [FreeTextEntry7] : Pain well-controlled overall. She thinks that tylenol and motrin made her feel "woozy" so she stoped taking them. Denies wooziness today or pain.  [FreeTextEntry6] : Tolerating regular diet without nausea [FreeTextEntry5] : Hard to say if she has any leakage because she is wearing pads for vaginal spotting, but she does not think she has leaking. Denies YANI or incomplete bladder emptying. Denies urinary frequency, urgency, or dysuria. [FreeTextEntry4] : Having regular BMs with the aid of stool softeners.  [FreeTextEntry3] : Ambulating well without issue [FreeTextEntry9] : Adherent to UTI abx.

## 2024-06-04 NOTE — DISCUSSION/SUMMARY
[FreeTextEntry1] : Batool is a 80 yo who is s/p APE, partial vaginectomy, closure, cysto on 5/23/24, doing well.   #Postop - Healing well postoperatively - Encouraged continued stool softeners for constipation management. Postop "wooziness" has resolved now for several days - encouraged her to call us if it returns  - Postoperative restrictions, instructions, and bleeding precautions reviewed. - Surgical pathology: N/A  #Inguinal candidiasis - Evidence of yeast infection in bilateral inguinal folds near vulva - Nystatin powder prescribed  #Preop UTI - Urine testing preop with >100K CFU E. coli. Patient could not be reached by the phone despite multiple attempts by our office. She started antibiotics postoperatively - Reports adherence to Bactrim course and denies symptoms of UTI today  She was counseled to call if any questions and RTO in 4 weeks or sooner, should issues arise. Patient verbalized understanding.  All questions answered.

## 2024-07-02 ENCOUNTER — APPOINTMENT (OUTPATIENT)
Dept: UROGYNECOLOGY | Facility: CLINIC | Age: 79
End: 2024-07-02
Payer: MEDICARE

## 2024-07-02 VITALS
SYSTOLIC BLOOD PRESSURE: 121 MMHG | HEIGHT: 68 IN | HEART RATE: 72 BPM | BODY MASS INDEX: 30.92 KG/M2 | DIASTOLIC BLOOD PRESSURE: 86 MMHG | WEIGHT: 204 LBS

## 2024-07-02 DIAGNOSIS — Z98.890 OTHER SPECIFIED POSTPROCEDURAL STATES: ICD-10-CM

## 2024-07-02 PROCEDURE — 99024 POSTOP FOLLOW-UP VISIT: CPT

## 2024-12-25 PROBLEM — F10.90 ALCOHOL USE: Status: ACTIVE | Noted: 2018-03-15

## 2025-01-07 ENCOUNTER — APPOINTMENT (OUTPATIENT)
Dept: UROGYNECOLOGY | Facility: CLINIC | Age: 80
End: 2025-01-07
Payer: MEDICARE

## 2025-01-07 VITALS
DIASTOLIC BLOOD PRESSURE: 88 MMHG | HEART RATE: 94 BPM | BODY MASS INDEX: 32.96 KG/M2 | WEIGHT: 210 LBS | SYSTOLIC BLOOD PRESSURE: 132 MMHG | HEIGHT: 67 IN

## 2025-01-07 DIAGNOSIS — R33.9 RETENTION OF URINE, UNSPECIFIED: ICD-10-CM

## 2025-01-07 DIAGNOSIS — N81.4 UTEROVAGINAL PROLAPSE, UNSPECIFIED: ICD-10-CM

## 2025-01-07 DIAGNOSIS — Z87.42 PERSONAL HISTORY OF OTHER DISEASES OF THE FEMALE GENITAL TRACT: ICD-10-CM

## 2025-01-07 PROCEDURE — 99212 OFFICE O/P EST SF 10 MIN: CPT

## 2025-07-08 ENCOUNTER — APPOINTMENT (OUTPATIENT)
Dept: UROGYNECOLOGY | Facility: CLINIC | Age: 80
End: 2025-07-08

## 2025-07-17 NOTE — SBIRT NOTE ADULT - NSSBIRTAUDITSCORE_GEN_A_CORE_CAL
Spine Surgery Evaluation       Chief Complaint:    Chief Complaint   Patient presents with   • Office Visit     Neck fracture        History of Present Illness:  Shu Almendarez is a 88 year old female presenting to the clinic for evaluation of cervical spine.   Seen as inpatient consultation for C6 fracture on 6/17/25. Fell at home 6/16/25. Suffered left hip fracture, cervical fracture. Has been in hard cervical collar since. Denies any neck pain, radiating arm pain, numbness/tingling, weakness in upper extremities. Currently at [facility]. No changes to bowel/bladder habits or saddle anesthesia.     Symptoms began: 6/16/25  Inciting event:  fall  Work related:  No.   Feels presenting complaint is: improving  Associated Symptoms: none   Pain location: neck   Pain at rest: 0  Pain with activity: 0   Previous spine surgery: No      Allergies:  ALLERGIES:   Allergen Reactions   • Ace Inhibitors Cough       Medications:  Current Outpatient Medications   Medication Sig Dispense Refill   • calcium carbonate-vitamin D (CALTRATE+D) 600-10 MG-MCG per tablet Take 1 tablet by mouth in the morning and 1 tablet in the evening. 60 tablet 0   • docusate sodium-sennosides (SENOKOT S) 50-8.6 MG per tablet Take 1 tablet by mouth 2 times daily as needed for Constipation. 30 tablet 0   • acetaminophen (TYLENOL) 325 MG tablet Take 2 tablets by mouth every 4 hours as needed for Pain. 90 tablet 0   • carvedilol (COREG) 6.25 MG tablet TAKE 1 TABLET BY MOUTH IN THE MORNING AND IN THE EVENING WITH MEALS 180 tablet 1   • warfarin (COUMADIN) 2 MG tablet Take, by mouth, 1 and 1/2 to 2 tablets daily as directed.  BLOOD THINNER 180 tablet 1   • allopurinol (ZYLOPRIM) 300 MG tablet TAKE 1/2 TABLET BY MOUTH DAILY 45 tablet 3   • levothyroxine 100 MCG tablet Take 1 tablet by mouth daily. 90 tablet 4   • rosuvastatin (CRESTOR) 5 MG tablet TAKE 1 TABLET BY MOUTH EVERY OTHER DAY 45 tablet 3   • polyethylene glycol (MIRALAX) 17 g packet Take 17 g by mouth  daily. Stir and dissolve powder in any 4 to 8 ounces of beverage, then drink. 30 each 0   • Vitamin D, Cholecalciferol, 50 mcg (2,000 units) capsule Take 1 capsule by mouth daily. 30 capsule 0   • ZINC SULFATE PO Take 30 mg by mouth daily.       No current facility-administered medications for this visit.     Facility-Administered Medications Ordered in Other Visits   Medication Dose Route Frequency Provider Last Rate Last Admin   • sodium chloride 0.9 % injector flush 100 mL  100 mL Injection Amadeo Hooker MD          Past Medical History:  Past Medical History:   Diagnosis Date   • Atrial fibrillation  (CMD) 2010, 2012   • Closed fracture of phalanx of digit of hand 05/16/2023   • Contusion of right thigh 09/21/2022   • COVID-19 11/2020   • Diverticulitis of colon with perforation 09/2018   • Diverticulitis of large intestine with abscess without bleeding 05/16/2023   • Gout    • HTN (hypertension)    • Hypercholesteremia    • Hyperparathyroidism  (CMD) 2002   • Long term (current) use of anticoagulants 10/02/2018   • Medullary thyroid carcinoma  (CMD) 07/08/2022   • Multinodular goiter 06/22/2022   • Osteoporosis 06/04/2019   • Other osteoporosis without current pathological fracture 02/06/2018   • PMH of 02/15/2012    Full code discussed w/Dr Andrew   • Right thigh pain 09/21/2022   • Skin cancer, basal cell 04/2014    Left forearm   • Squamous cell skin cancer 05/2012    Right preauricular facial lesion   • Tinnitus       Past Surgical History:  Past Surgical History:   Procedure Laterality Date   • BD DEXA AXIAL SKELETON  06/04/2019    Osteoporosis   • BIOPSY OF SKIN LESION  04/17/2012    Dr. Jimmy Metz- bx of Right preauricular facial lesion    • CARDIOVERSION  07/25/2013    in [place]   • CARDIOVERSION  05/30/2017   • DEXA BONE DENSITY AXIAL SKELETON  2011    WNL per pt   • ECTROPION REPAIR Bilateral    • EXC SKIN BENIG 1.1-2CM FACE,FACIAL  04/24/2012    Dr. Jimmy Metz-Excision of  inflammatory wall of the right preauricular area   • EXC SKIN MALIG 1.1-2CM FACE,FACIAL  2012    Dr. Jimmy Metz-Reexcision of suture line with 1.2 x 2.0 cm skin ellipse, Squamous cell cancer right preauricular area, positive margins   • EXC SKIN MALIG 1.1-2CM TRUNK,ARM,LEG Right 2015    Dr. Jimmy Metz - excision RUE basal cell cancer   • PARATHYROIDECTOMY OR EXPLORATION OF PARATHYROID(S) Left 2022    Left superior type A; ioPTH drop 71%; Dr. Orellana   • REMOVAL OF TONSILS,<15 Y/O      Tonsillectomy Alone   • THYROIDECTOMY=SUBSTERNAL,TRANSCERV  2022    Dr. Orellana   • TOTAL ABDOM HYSTERECTOMY      LESIA w BSO for fibroids      Family History:  Family History   Problem Relation Age of Onset   • Stroke Mother 92   • Heart Father 53        heart disease   • Heart disease Brother    • Cancer Maternal Grandmother 90        bladder cancer      Social History:  Social History     Socioeconomic History   • Marital status:      Spouse name: Albino   • Number of children: 2   • Years of education: Not on file   • Highest education level: Not on file   Occupational History   • Occupation: retired   Tobacco Use   • Smoking status: Former     Current packs/day: 0.00     Average packs/day: 0.3 packs/day for 30.0 years (9.0 ttl pk-yrs)     Types: Cigarettes     Start date: 1980     Quit date: 2010     Years since quittin.8   • Smokeless tobacco: Never   Vaping Use   • Vaping status: never used   Substance and Sexual Activity   • Alcohol use: Yes     Alcohol/week: 1.0 - 2.0 standard drink of alcohol     Types: 1 - 2 Standard drinks or equivalent per week   • Drug use: No   • Sexual activity: Not Currently   Other Topics Concern   • Not on file   Social History Narrative    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    Walks regularly and plays cards     passed away in      Social Drivers of Health     Financial Resource Strain: Low Risk  (2025)    Financial Resource  Strain    • Unable to Get: None   Food Insecurity: Low Risk  (6/16/2025)    Food Insecurity    • Worried about Food: Never true    • Food is Gone: Never true   Transportation Needs: Not At Risk (6/16/2025)    Transportation Needs    • Lack of Reliable Transportation: No   Physical Activity: Low Risk  (9/1/2024)    Exercise Vital Sign    • Days of Exercise per Week: 7 days    • Minutes of Exercise per Session: 30 min   Stress: Low Risk  (9/1/2024)    Stress    • How Stressed: A little bit   Social Connections: Low Risk  (6/16/2025)    Social Connections    • Social Connectivity: 5 or more times a week   Feeling safe in your relationship: Low Risk  (6/16/2025)    Interpersonal Safety    • How often physically hurt: Never    • How often insulted or talked down to: Never    • How often threatened with harm: Never    • How often scream or curse at: Never        Review of Systems:   12 point review of systems obtained, pertinent positives as noted in HPI.     Physical Exam:  Vitals: There were no vitals taken for this visit.   General: Well developed, nourished, Not obese body habitus, comfortable appearing  Psych: Alert and Oriented x3, appropriate affect  Head: Normal cephalic, a-traumatic    Cardiovascular: No peripheral edema  Vascular:  skin warm, well perfused  Gait:  in wheelchair  Cervical Spine: collar removed. No midline tenderness.   Motor:  Upper Extremity   Left Right   Deltoid 5/5 5/5   Biceps 5/5 5/5   Triceps 5/5 5/5   Hand  5/5 5/5     Sensation:  Upper Extremity     Left Right   C5 Lateral arm intact intact   C6 Lateral forearm, thumb, index, 1/2 middle finger intact intact   C7 Middle finger intact intact   C8 Ring and little fingers, medial forearm intact intact   T1 Medial arm intact intact     Provocative:   Long-tract signs: Negative Hoffmans    Documentation Reviewed:   Referring provider's documentation     Independent Interpretation of Diagnostic Tests/Imaging:  Diagnostic tests/ imaging  personally reviewed and discussed with patient:     7/16/25 2-3 view cervical x-rays  6/16/25 CT cervical spine    6/16/25 MRI cervical spine     Assessment and Medical Decision Making:    The encounter diagnosis was Closed nondisplaced fracture of sixth cervical vertebra, unspecified fracture morphology, initial encounter  (CMD).    88 year old female with C6 fracture. Fell on 6/16/25 at home, injured neck and fractured left hip. She has been hard cervical collar since. Denies neck pain, radicular pain, paresthesias, weakness.  Imaging reviewed. Demonstrates diffuse disc degeneration, no obvious worsening compression at C6. Discussed multiple treatment options. Current recommendation is transition to soft foam collar. Follow-up in 4 weeks with repeat x-rays. Patient verbalized understanding of plan.        Instructions:  Instructed the patient that in the event that symptoms change to contact the office or present to the local emergency room     Iram Arcos PA-C  Supervising Physician: Pancho Wilde MD    4

## 2025-07-18 ENCOUNTER — OUTPATIENT (OUTPATIENT)
Dept: OUTPATIENT SERVICES | Facility: HOSPITAL | Age: 80
LOS: 1 days | End: 2025-07-18
Payer: MEDICARE

## 2025-07-18 ENCOUNTER — APPOINTMENT (OUTPATIENT)
Dept: RADIOLOGY | Facility: HOSPITAL | Age: 80
End: 2025-07-18
Payer: MEDICARE

## 2025-07-18 DIAGNOSIS — Z96.651 PRESENCE OF RIGHT ARTIFICIAL KNEE JOINT: Chronic | ICD-10-CM

## 2025-07-18 DIAGNOSIS — Z00.8 ENCOUNTER FOR OTHER GENERAL EXAMINATION: ICD-10-CM

## 2025-07-18 DIAGNOSIS — Z90.49 ACQUIRED ABSENCE OF OTHER SPECIFIED PARTS OF DIGESTIVE TRACT: Chronic | ICD-10-CM

## 2025-07-18 PROCEDURE — 71046 X-RAY EXAM CHEST 2 VIEWS: CPT

## 2025-07-18 PROCEDURE — 71100 X-RAY EXAM RIBS UNI 2 VIEWS: CPT | Mod: 26,LT

## 2025-07-18 PROCEDURE — 71100 X-RAY EXAM RIBS UNI 2 VIEWS: CPT

## 2025-07-18 PROCEDURE — 71046 X-RAY EXAM CHEST 2 VIEWS: CPT | Mod: 26

## 2025-07-21 ENCOUNTER — APPOINTMENT (OUTPATIENT)
Dept: CT IMAGING | Facility: HOSPITAL | Age: 80
End: 2025-07-21
Payer: MEDICARE

## 2025-07-21 ENCOUNTER — OUTPATIENT (OUTPATIENT)
Dept: OUTPATIENT SERVICES | Facility: HOSPITAL | Age: 80
LOS: 1 days | End: 2025-07-21
Payer: MEDICARE

## 2025-07-21 DIAGNOSIS — Z90.49 ACQUIRED ABSENCE OF OTHER SPECIFIED PARTS OF DIGESTIVE TRACT: Chronic | ICD-10-CM

## 2025-07-21 DIAGNOSIS — Z96.651 PRESENCE OF RIGHT ARTIFICIAL KNEE JOINT: Chronic | ICD-10-CM

## 2025-07-21 DIAGNOSIS — Z00.8 ENCOUNTER FOR OTHER GENERAL EXAMINATION: ICD-10-CM

## 2025-07-21 PROCEDURE — 71250 CT THORAX DX C-: CPT | Mod: 26

## 2025-07-21 PROCEDURE — 71250 CT THORAX DX C-: CPT

## 2025-07-28 ENCOUNTER — APPOINTMENT (OUTPATIENT)
Dept: CT IMAGING | Facility: HOSPITAL | Age: 80
End: 2025-07-28
Payer: MEDICARE

## 2025-07-28 ENCOUNTER — OUTPATIENT (OUTPATIENT)
Dept: OUTPATIENT SERVICES | Facility: HOSPITAL | Age: 80
LOS: 1 days | End: 2025-07-28
Payer: MEDICARE

## 2025-07-28 DIAGNOSIS — Z90.49 ACQUIRED ABSENCE OF OTHER SPECIFIED PARTS OF DIGESTIVE TRACT: Chronic | ICD-10-CM

## 2025-07-28 PROCEDURE — 71250 CT THORAX DX C-: CPT | Mod: 26

## 2025-07-28 PROCEDURE — 71250 CT THORAX DX C-: CPT | Mod: MH

## 2025-08-15 ENCOUNTER — APPOINTMENT (OUTPATIENT)
Dept: RADIOLOGY | Facility: HOSPITAL | Age: 80
End: 2025-08-15
Payer: MEDICARE

## 2025-08-15 ENCOUNTER — OUTPATIENT (OUTPATIENT)
Dept: OUTPATIENT SERVICES | Facility: HOSPITAL | Age: 80
LOS: 1 days | End: 2025-08-15
Payer: MEDICARE

## 2025-08-15 DIAGNOSIS — Z96.651 PRESENCE OF RIGHT ARTIFICIAL KNEE JOINT: Chronic | ICD-10-CM

## 2025-08-15 DIAGNOSIS — Z90.49 ACQUIRED ABSENCE OF OTHER SPECIFIED PARTS OF DIGESTIVE TRACT: Chronic | ICD-10-CM

## 2025-08-15 DIAGNOSIS — Z00.8 ENCOUNTER FOR OTHER GENERAL EXAMINATION: ICD-10-CM

## 2025-08-15 PROCEDURE — 74021 RADEX ABDOMEN 3+ VIEWS: CPT | Mod: 26

## 2025-08-15 PROCEDURE — 74021 RADEX ABDOMEN 3+ VIEWS: CPT

## (undated) DEVICE — GLV 7 PROTEXIS (WHITE)

## (undated) DEVICE — DRAPE MAYO STAND 30"

## (undated) DEVICE — BLADE SCALPEL SAFETYLOCK #15

## (undated) DEVICE — BLADE SCALPEL SAFETYLOCK #10

## (undated) DEVICE — SOL IRR POUR H2O 250ML

## (undated) DEVICE — SUT POLYSORB 2-0 30" V-20 UNDYED

## (undated) DEVICE — FOLEY TRAY 16FR LF URINE METER SURESTEP

## (undated) DEVICE — MEDICATION LABELS W MARKER

## (undated) DEVICE — SYR LUER LOK 20CC

## (undated) DEVICE — PREP BETADINE KIT

## (undated) DEVICE — TUBING SUCTION 20FT

## (undated) DEVICE — PREP CHLOROHEXIDINE 4% 118CC KIT

## (undated) DEVICE — NDL HYPO REGULAR BEVEL 22G X 1.5" (TURQUOISE)

## (undated) DEVICE — SPECIMEN CONTAINER 100ML

## (undated) DEVICE — SUT POLYSORB 0 30" GS-21 UNDYED

## (undated) DEVICE — DRSG KLING 3"

## (undated) DEVICE — FOLEY TRAY 16FR 5CC LTX UMETER CLOSED

## (undated) DEVICE — SUT POLYSORB 0 18" GS-21

## (undated) DEVICE — TUBING TUR 2 PRONG

## (undated) DEVICE — DRAPE TOWEL BLUE 17" X 24"

## (undated) DEVICE — DRSG DERMABOND 0.7ML

## (undated) DEVICE — PACK GYN LAPAROSCOPY

## (undated) DEVICE — ELCTR BOVIE PENCIL SMOKE EVACUATION

## (undated) DEVICE — SUT SURGIPRO 2-0 30" GS-22

## (undated) DEVICE — VISITEC 4X4

## (undated) DEVICE — SUCTION YANKAUER NO CONTROL VENT

## (undated) DEVICE — FOLEY HOLDER STATLOCK 2 WAY ADULT

## (undated) DEVICE — SUT PDS II 0 27" CT-2

## (undated) DEVICE — GLV 6.5 PROTEXIS (WHITE)

## (undated) DEVICE — POSITIONER FOAM EGG CRATE ULNAR 2PCS (PINK)

## (undated) DEVICE — SOL IRR BAG H2O 3000ML

## (undated) DEVICE — DRAPE INSTRUMENT POUCH 6.75" X 11"

## (undated) DEVICE — DRAPE LIGHT HANDLE COVER (BLUE)

## (undated) DEVICE — LIGASURE IMPACT

## (undated) DEVICE — LONE STAR RETRACTOR RING 32.5CM X 18.3CM DISP

## (undated) DEVICE — SUT PDS II 2-0 27" SH

## (undated) DEVICE — WARMING BLANKET UPPER ADULT

## (undated) DEVICE — SOL IRR POUR NS 0.9% 500ML

## (undated) DEVICE — MARKING PEN W RULER

## (undated) DEVICE — LONE STAR ELASTIC STAY HOOK 5MM SHARP

## (undated) DEVICE — GOWN TRIMAX LG

## (undated) DEVICE — DRAPE 3/4 SHEET W REINFORCEMENT 56X77"

## (undated) DEVICE — VENODYNE/SCD SLEEVE CALF MEDIUM

## (undated) DEVICE — LAP PAD 4 X 18"

## (undated) DEVICE — SUT POLYSORB 3-0 30" V-20 UNDYED